# Patient Record
Sex: MALE | Race: BLACK OR AFRICAN AMERICAN | NOT HISPANIC OR LATINO | Employment: FULL TIME | URBAN - METROPOLITAN AREA
[De-identification: names, ages, dates, MRNs, and addresses within clinical notes are randomized per-mention and may not be internally consistent; named-entity substitution may affect disease eponyms.]

---

## 2017-11-20 ENCOUNTER — TELEPHONE (OUTPATIENT)
Dept: FAMILY MEDICINE | Facility: CLINIC | Age: 37
End: 2017-11-20

## 2017-11-20 DIAGNOSIS — Z00.00 ROUTINE GENERAL MEDICAL EXAMINATION AT A HEALTH CARE FACILITY: Primary | ICD-10-CM

## 2017-11-20 NOTE — TELEPHONE ENCOUNTER
----- Message from La Nena Light sent at 11/20/2017  4:04 PM CST -----  Contact: self [phone]  Doctor appointment and lab have been scheduled.  Please link lab orders to the lab appointment.  Date of doctor appointment: 12/12   Physical or EP:  physical  Date of lab appointment:  12/05  Comments:

## 2017-12-05 ENCOUNTER — LAB VISIT (OUTPATIENT)
Dept: LAB | Facility: HOSPITAL | Age: 37
End: 2017-12-05
Attending: FAMILY MEDICINE
Payer: COMMERCIAL

## 2017-12-05 DIAGNOSIS — Z00.00 ROUTINE GENERAL MEDICAL EXAMINATION AT A HEALTH CARE FACILITY: ICD-10-CM

## 2017-12-05 LAB
ALBUMIN SERPL BCP-MCNC: 3.4 G/DL
ALP SERPL-CCNC: 66 U/L
ALT SERPL W/O P-5'-P-CCNC: 36 U/L
ANION GAP SERPL CALC-SCNC: 8 MMOL/L
AST SERPL-CCNC: 19 U/L
BASOPHILS # BLD AUTO: 0.04 K/UL
BASOPHILS NFR BLD: 0.6 %
BILIRUB SERPL-MCNC: 0.4 MG/DL
BUN SERPL-MCNC: 11 MG/DL
CALCIUM SERPL-MCNC: 9.5 MG/DL
CHLORIDE SERPL-SCNC: 108 MMOL/L
CHOLEST SERPL-MCNC: 303 MG/DL
CHOLEST/HDLC SERPL: 7.2 {RATIO}
CO2 SERPL-SCNC: 24 MMOL/L
CREAT SERPL-MCNC: 1.1 MG/DL
DIFFERENTIAL METHOD: ABNORMAL
EOSINOPHIL # BLD AUTO: 0.3 K/UL
EOSINOPHIL NFR BLD: 4.2 %
ERYTHROCYTE [DISTWIDTH] IN BLOOD BY AUTOMATED COUNT: 14 %
EST. GFR  (AFRICAN AMERICAN): >60 ML/MIN/1.73 M^2
EST. GFR  (NON AFRICAN AMERICAN): >60 ML/MIN/1.73 M^2
GLUCOSE SERPL-MCNC: 104 MG/DL
HCT VFR BLD AUTO: 44.5 %
HDLC SERPL-MCNC: 42 MG/DL
HDLC SERPL: 13.9 %
HGB BLD-MCNC: 14.4 G/DL
IMM GRANULOCYTES # BLD AUTO: 0.07 K/UL
IMM GRANULOCYTES NFR BLD AUTO: 1.1 %
LDLC SERPL CALC-MCNC: 208.8 MG/DL
LYMPHOCYTES # BLD AUTO: 2.3 K/UL
LYMPHOCYTES NFR BLD: 34.2 %
MCH RBC QN AUTO: 29.4 PG
MCHC RBC AUTO-ENTMCNC: 32.4 G/DL
MCV RBC AUTO: 91 FL
MONOCYTES # BLD AUTO: 0.7 K/UL
MONOCYTES NFR BLD: 10 %
NEUTROPHILS # BLD AUTO: 3.3 K/UL
NEUTROPHILS NFR BLD: 49.9 %
NONHDLC SERPL-MCNC: 261 MG/DL
NRBC BLD-RTO: 0 /100 WBC
PLATELET # BLD AUTO: 245 K/UL
PMV BLD AUTO: 10.7 FL
POTASSIUM SERPL-SCNC: 4.1 MMOL/L
PROT SERPL-MCNC: 7.5 G/DL
RBC # BLD AUTO: 4.9 M/UL
SODIUM SERPL-SCNC: 140 MMOL/L
TRIGL SERPL-MCNC: 261 MG/DL
WBC # BLD AUTO: 6.63 K/UL

## 2017-12-05 PROCEDURE — 80053 COMPREHEN METABOLIC PANEL: CPT

## 2017-12-05 PROCEDURE — 85025 COMPLETE CBC W/AUTO DIFF WBC: CPT

## 2017-12-05 PROCEDURE — 80061 LIPID PANEL: CPT

## 2017-12-05 PROCEDURE — 36415 COLL VENOUS BLD VENIPUNCTURE: CPT | Mod: PO

## 2018-02-22 ENCOUNTER — OFFICE VISIT (OUTPATIENT)
Dept: FAMILY MEDICINE | Facility: CLINIC | Age: 38
End: 2018-02-22
Payer: COMMERCIAL

## 2018-02-22 VITALS
TEMPERATURE: 98 F | WEIGHT: 295.19 LBS | SYSTOLIC BLOOD PRESSURE: 120 MMHG | HEIGHT: 72 IN | BODY MASS INDEX: 39.98 KG/M2 | HEART RATE: 80 BPM | DIASTOLIC BLOOD PRESSURE: 79 MMHG

## 2018-02-22 DIAGNOSIS — Z23 NEED FOR PROPHYLACTIC VACCINATION AND INOCULATION AGAINST INFLUENZA: ICD-10-CM

## 2018-02-22 DIAGNOSIS — R06.83 SNORING: ICD-10-CM

## 2018-02-22 DIAGNOSIS — Z28.39 IMMUNIZATION DEFICIENCY: ICD-10-CM

## 2018-02-22 DIAGNOSIS — Z00.00 ROUTINE GENERAL MEDICAL EXAMINATION AT A HEALTH CARE FACILITY: Primary | ICD-10-CM

## 2018-02-22 DIAGNOSIS — R40.0 DAYTIME SOMNOLENCE: ICD-10-CM

## 2018-02-22 DIAGNOSIS — H91.93 BILATERAL HEARING LOSS, UNSPECIFIED HEARING LOSS TYPE: ICD-10-CM

## 2018-02-22 DIAGNOSIS — I10 HTN (HYPERTENSION), BENIGN: ICD-10-CM

## 2018-02-22 DIAGNOSIS — Z72.0 TOBACCO USE: ICD-10-CM

## 2018-02-22 DIAGNOSIS — E78.5 HYPERLIPIDEMIA, UNSPECIFIED HYPERLIPIDEMIA TYPE: ICD-10-CM

## 2018-02-22 DIAGNOSIS — B35.3 TINEA PEDIS OF BOTH FEET: ICD-10-CM

## 2018-02-22 DIAGNOSIS — Z80.0 FAMILY HISTORY OF COLON CANCER: ICD-10-CM

## 2018-02-22 PROCEDURE — 90686 IIV4 VACC NO PRSV 0.5 ML IM: CPT | Mod: S$GLB,,, | Performed by: FAMILY MEDICINE

## 2018-02-22 PROCEDURE — 90472 IMMUNIZATION ADMIN EACH ADD: CPT | Mod: S$GLB,,, | Performed by: FAMILY MEDICINE

## 2018-02-22 PROCEDURE — 99999 PR PBB SHADOW E&M-EST. PATIENT-LVL IV: CPT | Mod: PBBFAC,,, | Performed by: FAMILY MEDICINE

## 2018-02-22 PROCEDURE — 99395 PREV VISIT EST AGE 18-39: CPT | Mod: 25,S$GLB,, | Performed by: FAMILY MEDICINE

## 2018-02-22 PROCEDURE — 90471 IMMUNIZATION ADMIN: CPT | Mod: S$GLB,,, | Performed by: FAMILY MEDICINE

## 2018-02-22 PROCEDURE — 90715 TDAP VACCINE 7 YRS/> IM: CPT | Mod: S$GLB,,, | Performed by: FAMILY MEDICINE

## 2018-02-22 RX ORDER — FENOFIBRATE 160 MG/1
160 TABLET ORAL DAILY
Qty: 90 TABLET | Refills: 3 | Status: SHIPPED | OUTPATIENT
Start: 2018-02-22 | End: 2018-02-22

## 2018-02-22 RX ORDER — VARENICLINE TARTRATE 0.5 (11)-1
KIT ORAL
Qty: 1 PACKAGE | Refills: 0 | Status: SHIPPED | OUTPATIENT
Start: 2018-02-22 | End: 2018-07-26 | Stop reason: SDUPTHER

## 2018-02-22 RX ORDER — LOSARTAN POTASSIUM 100 MG/1
100 TABLET ORAL DAILY
Qty: 90 TABLET | Refills: 3 | Status: SHIPPED | OUTPATIENT
Start: 2018-02-22 | End: 2019-08-25 | Stop reason: SDUPTHER

## 2018-02-22 RX ORDER — ATORVASTATIN CALCIUM 40 MG/1
40 TABLET, FILM COATED ORAL DAILY
Qty: 90 TABLET | Refills: 3 | Status: SHIPPED | OUTPATIENT
Start: 2018-02-22 | End: 2019-08-25 | Stop reason: SDUPTHER

## 2018-02-22 NOTE — PROGRESS NOTES
Two patient identifiers used, allergies reviewed, vaccines confirmed.  Tdap vaccine administered IM left deltoid.  Flu consent signed by patient. Flu vaccine administered IM right deltoid.  Pt tolerated both injections well, no redness or bruising at either injection site.  Pt advised to remain in clinic 15 mins following injections for observation.

## 2018-02-22 NOTE — PATIENT INSTRUCTIONS
STAYOFF fenofibrate & rosuvastatin (he has been out for several months due to cost)    Start ATORVASTATIN 40 mg daily    Lipids in 1-2  month    I recommend compression stockings daily    ============================  I counselled patient to stop smoking - high risk of cancer , heart attack and stroke.    Goal LDL cholesterol is < 70    FREE SMOKING CESSATION PROGRAM AT [facility] [address]  Sign up for online ID card [email or web address]  Or  [email or web address]. Then call  [phone].  ===============================      ==============================================  FOR HIGH BLOOD PRESSURE (HYPERTENSION)-------------    Take your Blood pressure medication every day     Try to stop these things that can elevate blood pressuere: salt, caffeine, energy drinks, diet pills, sudafed, alcohol , taking NSAIDS daily (advil, alleve, ibuprofen, naproxen) and birth control    DECREASE ALCOHOL CONSUMPTION    DECREASE SALT (fast foods, frozen, canned, processed foods, ham, turkey, fried foods, chips, crackers, etc) & drink 8 glasses of water a day with minimal caffeine ( 1 cup a day)   ==============================================  Due for  Vaccines  -   Your insurance will pay for certain vaccines only when administered by your pharmacy. We sent a prescription to your pharmacy    ================================  RECOMMENDATIONS FOR MALES   ================================    Prevent the #1 cause of death- cardiovascular disease (HEART ATTACK & STROKE) by checking for normal blood pressure, cholesterol, sugars, & by not smoking.     Yearly FLU shot, ONE pneumonia shot after 65, ONE Shingles vaccine after 60    Screening colonoscopy to check for COLON CANCER,  At 41 yo since your dad was diagnosed at 51 yo    I recommend  high fiber (5 fresh fruits or vegetables daily), low fat diet and aerobic  exercise (huffing/ puffing/ sweating for 20 min straight at least 4 days a week)    Follow up yearly with fasting  lipids, CMP, CBC, TSH prior

## 2018-02-22 NOTE — PROGRESS NOTES
Subjective:      Patient ID: Rigoberto Zurita Jr. is a 37 y.o. male.    Chief Complaint: Annual Exam    Here today for general exam.     Denies any chest pain, shortness of breath, nausea vomiting constipation diarrhea, blood in stool, heartburn    Rash on toes & foot that he would like evaluated    He is out of fenofibrate & rosuvastatin - too costly.     He is smoking & his friend Mirlande is requesting Chantix.     There requesting referral for sleep study with snoring and falling asleep during the day, also he has decreased hearing and they would like referral for hearing evaluation    No results found for: HGBA1C  Lab Results   Component Value Date    MICALBCREAT UNABLE TO CALCULATE 12/07/2011     Lab Results   Component Value Date    LDLCALC 208.8 (H) 12/05/2017    LDLCALC 181.8 (H) 06/22/2016    CHOL 303 (H) 12/05/2017    HDL 42 12/05/2017    TRIG 261 (H) 12/05/2017       Lab Results   Component Value Date     12/05/2017    K 4.1 12/05/2017     12/05/2017    CO2 24 12/05/2017     12/05/2017    BUN 11 12/05/2017    CREATININE 1.1 12/05/2017    CALCIUM 9.5 12/05/2017    PROT 7.5 12/05/2017    ALBUMIN 3.4 (L) 12/05/2017    BILITOT 0.4 12/05/2017    ALKPHOS 66 12/05/2017    AST 19 12/05/2017    ALT 36 12/05/2017    ANIONGAP 8 12/05/2017    ESTGFRAFRICA >60.0 12/05/2017    EGFRNONAA >60.0 12/05/2017    WBC 6.63 12/05/2017    HGB 14.4 12/05/2017    HCT 44.5 12/05/2017    MCV 91 12/05/2017     12/05/2017    TSH 1.833 01/18/2016         Current Outpatient Prescriptions on File Prior to Visit   Medication Sig    ketoconazole (NIZORAL) 2 % cream Apply topically once daily.    [ losartan (COZAAR) 100 MG tablet TAKE 1 TABLET BY MOUTH EVERY DAY     Past Medical History:   Diagnosis Date    HLD (hyperlipidemia) 9/25/2014    goal LDL < 100, fenofibrate    HTN (hypertension), benign 9/25/2014    Tinea pedis of both feet 2/22/2018    Tobacco use 9/25/2014     No past surgical history on  file.  Social History     Social History Narrative    Works at 's office, with Mirlande, smokes 1 pack in 2 weeks, ETOH rarely, 3 yo daughter & 10 yo     Family History   Problem Relation Age of Onset    Thyroid disease Mother     Hypertension Mother     Hyperlipidemia Mother     Heart disease Mother      stent    Colon cancer Father      about age 50    Hypertension Maternal Grandfather     Hyperlipidemia Maternal Grandfather     Heart disease Maternal Grandfather     No Known Problems Daughter     No Known Problems Daughter      Vitals:    02/22/18 0926   BP: 120/79   Pulse: 80   Temp: 97.8 °F (36.6 °C)   Weight: 133.9 kg (295 lb 3.1 oz)   Height: 6' (1.829 m)     Objective:   Physical Exam   Constitutional: He is oriented to person, place, and time. He appears well-developed and well-nourished.   HENT:   Head: Normocephalic and atraumatic.   Right Ear: External ear normal.   Left Ear: External ear normal.   Nose: Nose normal.   Mouth/Throat: Oropharynx is clear and moist.   Eyes: EOM are normal. Pupils are equal, round, and reactive to light.   Neck: Neck supple. No thyromegaly present.   Cardiovascular: Normal rate, regular rhythm, normal heart sounds and intact distal pulses.    No murmur heard.  Pulmonary/Chest: Effort normal and breath sounds normal. He has no wheezes.   Abdominal: Soft. Bowel sounds are normal. He exhibits no distension and no mass. There is no tenderness. There is no rebound and no guarding.   Musculoskeletal: He exhibits no edema.   Lymphadenopathy:     He has no cervical adenopathy.   Neurological: He is alert and oriented to person, place, and time.   Skin: Skin is warm and dry.   Thickened toenails ,  bilateral soles of feet feet with flaking   Psychiatric: He has a normal mood and affect. His behavior is normal.     Assessment:     1. Routine general medical examination at a health care facility    2. Immunization deficiency    3. Need for prophylactic vaccination and  inoculation against influenza    4. HTN (hypertension), benign    5. Hyperlipidemia, unspecified hyperlipidemia type    6. Tobacco use    7. Family history of colon cancer    8. Tinea pedis of both feet    9. Daytime somnolence    10. Snoring    11. Bilateral hearing loss, unspecified hearing loss type      Plan:     Orders Placed This Encounter    Polysomnogram (CPAP will be added if patient meets diagnostic criteria.)    (In Office Administered) Tdap Vaccine    Flu Vaccine - Quadrivalent (PF) (3 years & older)    Lipid panel    Ambulatory referral to Podiatry    Ambulatory referral to ENT    Ambulatory consult to Audiology    losartan (COZAAR) 100 MG tablet    varenicline (CHANTIX STARTING MONTH BOX) 0.5 mg (11)- 1 mg (42) tablet    atorvastatin (LIPITOR) 40 MG tablet     His friend Mirlande & he are joining a gym [employer]    Patient Instructions   STAYOFF fenofibrate & rosuvastatin (he has been out for several months due to cost)    Start ATORVASTATIN 40 mg daily    Lipids in 1-2  month    I recommend compression stockings daily    ============================  I counselled patient to stop smoking - high risk of cancer , heart attack and stroke.    Goal LDL cholesterol is < 70    FREE SMOKING CESSATION PROGRAM AT [facility]  Sign up for online ID card [email or web address]  Or  [email or web address]. Then call  [phone].  ===============================      ==============================================  FOR HIGH BLOOD PRESSURE (HYPERTENSION)-------------    Take your Blood pressure medication every day     Try to stop these things that can elevate blood pressuere: salt, caffeine, energy drinks, diet pills, sudafed, alcohol , taking NSAIDS daily (advil, alleve, ibuprofen, naproxen) and birth control    DECREASE ALCOHOL CONSUMPTION    DECREASE SALT (fast foods, frozen, canned, processed foods, ham, turkey, fried foods, chips, crackers, etc) & drink 8 glasses of water a day with minimal  caffeine ( 1 cup a day)   ==============================================  Due for  Vaccines  -   Your insurance will pay for certain vaccines only when administered by your pharmacy. We sent a prescription to your pharmacy    ================================  RECOMMENDATIONS FOR MALES   ================================    Prevent the #1 cause of death- cardiovascular disease (HEART ATTACK & STROKE) by checking for normal blood pressure, cholesterol, sugars, & by not smoking.     Yearly FLU shot, ONE pneumonia shot after 65, ONE Shingles vaccine after 60    Screening colonoscopy to check for COLON CANCER,  At 43 yo since your dad was diagnosed at 51 yo    I recommend  high fiber (5 fresh fruits or vegetables daily), low fat diet and aerobic  exercise (huffing/ puffing/ sweating for 20 min straight at least 4 days a week)    Follow up yearly with fasting lipids, CMP, CBC, TSH prior

## 2018-02-28 ENCOUNTER — TELEPHONE (OUTPATIENT)
Dept: FAMILY MEDICINE | Facility: CLINIC | Age: 38
End: 2018-02-28

## 2018-02-28 NOTE — TELEPHONE ENCOUNTER
Pt advised Prior Auth for Chantix requires documentation that pt has had a history of failure, contradiction, or intolerance to:    Buproion  Habitrol OTC  Nicoderm CQ OTC  Nicorette gum OTC  Nicorette lozenge OTC  Nicorette mini-lozenge OTC  Thrive gum OTC  Thrive lozenge OTC    Pt has not tried any of the above and verbalizes understanding that he can call our office back in 6-8 weeks if any of the above medications prove ineffective.

## 2018-03-05 ENCOUNTER — OFFICE VISIT (OUTPATIENT)
Dept: PODIATRY | Facility: CLINIC | Age: 38
End: 2018-03-05
Payer: COMMERCIAL

## 2018-03-05 VITALS — BODY MASS INDEX: 39.96 KG/M2 | HEIGHT: 72 IN | WEIGHT: 295 LBS

## 2018-03-05 DIAGNOSIS — B35.1 ONYCHOMYCOSIS DUE TO DERMATOPHYTE: ICD-10-CM

## 2018-03-05 DIAGNOSIS — B35.3 TINEA PEDIS OF BOTH FEET: Primary | ICD-10-CM

## 2018-03-05 PROCEDURE — 99999 PR PBB SHADOW E&M-EST. PATIENT-LVL III: CPT | Mod: PBBFAC,,,

## 2018-03-05 PROCEDURE — 99203 OFFICE O/P NEW LOW 30 MIN: CPT | Mod: S$GLB,,,

## 2018-03-05 RX ORDER — TERBINAFINE HYDROCHLORIDE 250 MG/1
250 TABLET ORAL DAILY
Qty: 90 TABLET | Refills: 0 | Status: SHIPPED | OUTPATIENT
Start: 2018-03-05 | End: 2018-06-04

## 2018-03-05 NOTE — PROGRESS NOTES
Chief Complaint   Patient presents with    Tinea Pedis      Bilateral       History of present illness:Patient presents to clinic complaining of painful thickened and discolored  Toenails as well as itchy scaly rash to feet, tried creams which did not help. Patint is having tenderness with palpation and with shoe gear. Patient has tried some topical antifungals which did not help. No history of liver disease.    Review of Systems:  Vascular : negative for rest pain, claudication or bruising  Respiratory: negative for cough or shortness of breath  Musculoskeletal: negative for joint pain   Skin: Positive for toenail infection   Neurological: negative for burning, tingling and numbness  All other negative.    Constitutional:  Patient is oriented to person, place, and time. Vital signs are normal.  Appears well-developed and well-nourished.     Vascular:  Dorsalis pedis pulses are 2+ on the right side, and 2+ on the left side.   Posterior tibial pulses are 2+ on the right side, and 2+ on the left side.    + digital hair growth, capillary fill time to all toes <3 seconds, no swelling    Skin/Dermatological:  Skin is warm and intact.  No cyanosis or clubbing. No rashes noted.  No open wounds.  All toenails discolored, thickened with subungual debris, tender to palpation  Ada distribution of erythema and scale bilateral feet    Musculoskeletal:      Normal range of motion of bilateral pedal joints and bilateral ankle with knee flexed and extended, no deformity, tenderness or crepitus. No assymmetries noted.        Neurological:  No deficits to sharp/dull, light touch or vibratory sensation.   Muscle strength to tibialis anterior, extensor hallucis longus, extensor digitorum longus, peroneal muscles, flexor hallucis/digotorum longus, posterior tibial and gastrosoleal complex is 5/5, normal tone without assymmetry             Assessment/Plan:    Onychomycosis, tinea pedis: Discussed etiology of the problem and  treatment options, including topical, oral and laser therapy. LFT recent ok.  Rx Lamisil 250 mg orally x 12 weeks. Repeat liver function tests in 6 weeks.  Lysol to this shoes.  Avoid cotton socks.  Antifungal sprays to the feet.  Return to clinic 3 months.

## 2018-03-05 NOTE — PATIENT INSTRUCTIONS
Keep feet clean and dry.  Vinegar soaks weekly (1 parts vinegar/2 part warm water).   Avoid cotton socks.  Powders to shoes, antiperspirants to feet daily.  Lysol spray to shoes twice weekly.    Liver blood test in 6 weeks.    F/U with me in 3 months.    Use topical antifungal for toenails daily over the counter.

## 2018-03-05 NOTE — LETTER
March 5, 2018      Karma Bryant MD  [address]  [address]  [facility] [place] [facility] [address]           [place] - Podiatry  [address] [address]  Phone: [phone]          Patient: Rigoberto Zurita Jr.   MR Number: [id number]   YOB: 1980   Date of Visit: 3/5/2018       Dear Dr. Karma Bryant:    Thank you for referring Rigoberto Zurita to me for evaluation. Attached you will find relevant portions of my assessment and plan of care.    If you have questions, please do not hesitate to call me. I look forward to following Rigoberto Zurita along with you.    Sincerely,    Jeff Geiger, LEXIE    Enclosure  CC:  No Recipients    If you would like to receive this communication electronically, please contact [email or web address] or [phone] to request more information on "[employer] Link access.    For providers and/or their staff who would like to refer a patient to [facility], please contact us through our one-stop-shop provider referral line, [facility], at [phone].    If you feel you have received this communication in error or would no longer like to receive these types of communications, please e-mail [email or web address]

## 2018-03-19 ENCOUNTER — TELEPHONE (OUTPATIENT)
Dept: SLEEP MEDICINE | Facility: CLINIC | Age: 38
End: 2018-03-19

## 2018-03-26 ENCOUNTER — TELEPHONE (OUTPATIENT)
Dept: OTOLARYNGOLOGY | Facility: CLINIC | Age: 38
End: 2018-03-26

## 2018-03-26 ENCOUNTER — OFFICE VISIT (OUTPATIENT)
Dept: OTOLARYNGOLOGY | Facility: CLINIC | Age: 38
End: 2018-03-26
Payer: COMMERCIAL

## 2018-03-26 ENCOUNTER — CLINICAL SUPPORT (OUTPATIENT)
Dept: OTOLARYNGOLOGY | Facility: CLINIC | Age: 38
End: 2018-03-26
Payer: COMMERCIAL

## 2018-03-26 VITALS
DIASTOLIC BLOOD PRESSURE: 82 MMHG | SYSTOLIC BLOOD PRESSURE: 132 MMHG | HEIGHT: 72 IN | TEMPERATURE: 97 F | HEART RATE: 77 BPM

## 2018-03-26 DIAGNOSIS — H69.93 DYSFUNCTION OF BOTH EUSTACHIAN TUBES: ICD-10-CM

## 2018-03-26 DIAGNOSIS — H90.6 MIXED CONDUCTIVE AND SENSORINEURAL HEARING LOSS OF BOTH EARS: ICD-10-CM

## 2018-03-26 DIAGNOSIS — J34.2 NASAL SEPTAL DEVIATION: ICD-10-CM

## 2018-03-26 DIAGNOSIS — R06.83 SNORING: ICD-10-CM

## 2018-03-26 DIAGNOSIS — R09.81 NASAL CONGESTION: ICD-10-CM

## 2018-03-26 DIAGNOSIS — H90.6 MIXED HEARING LOSS, BILATERAL: Primary | ICD-10-CM

## 2018-03-26 PROCEDURE — 3075F SYST BP GE 130 - 139MM HG: CPT | Mod: CPTII,S$GLB,, | Performed by: OTOLARYNGOLOGY

## 2018-03-26 PROCEDURE — 31231 NASAL ENDOSCOPY DX: CPT | Mod: S$GLB,,, | Performed by: OTOLARYNGOLOGY

## 2018-03-26 PROCEDURE — 92550 TYMPANOMETRY & REFLEX THRESH: CPT | Mod: 52,S$GLB,, | Performed by: AUDIOLOGIST

## 2018-03-26 PROCEDURE — 92557 COMPREHENSIVE HEARING TEST: CPT | Mod: S$GLB,,, | Performed by: AUDIOLOGIST

## 2018-03-26 PROCEDURE — 3079F DIAST BP 80-89 MM HG: CPT | Mod: CPTII,S$GLB,, | Performed by: OTOLARYNGOLOGY

## 2018-03-26 PROCEDURE — 99204 OFFICE O/P NEW MOD 45 MIN: CPT | Mod: 25,S$GLB,, | Performed by: OTOLARYNGOLOGY

## 2018-03-26 RX ORDER — AZELASTINE 1 MG/ML
2 SPRAY, METERED NASAL 2 TIMES DAILY
Qty: 30 ML | Refills: 2 | Status: SHIPPED | OUTPATIENT
Start: 2018-03-26 | End: 2018-03-26

## 2018-03-26 RX ORDER — OLOPATADINE HYDROCHLORIDE 665 UG/1
1 SPRAY NASAL 2 TIMES DAILY
Qty: 1 BOTTLE | Refills: 0 | Status: CANCELLED | OUTPATIENT
Start: 2018-03-26

## 2018-03-26 RX ORDER — FLUTICASONE PROPIONATE 50 MCG
2 SPRAY, SUSPENSION (ML) NASAL NIGHTLY
Qty: 16 G | Refills: 2 | Status: SHIPPED | OUTPATIENT
Start: 2018-03-26 | End: 2020-01-27 | Stop reason: CLARIF

## 2018-03-26 RX ORDER — OLOPATADINE HYDROCHLORIDE 665 UG/1
1 SPRAY NASAL 2 TIMES DAILY
Qty: 1 BOTTLE | Refills: 0 | Status: SHIPPED | OUTPATIENT
Start: 2018-03-26 | End: 2018-03-26

## 2018-03-26 RX ORDER — METHYLPREDNISOLONE 4 MG/1
TABLET ORAL
Qty: 1 PACKAGE | Refills: 0 | Status: SHIPPED | OUTPATIENT
Start: 2018-03-26 | End: 2018-07-26 | Stop reason: ALTCHOICE

## 2018-03-26 NOTE — LETTER
April 11, 2018      Karma Bryant MD  [address] [facility]  [address]  [place] [facility] [place] [facility] [address]           [place] [facility] O[facility]hinol[facility]ology  [address]  [facility] [address]  Phone: [phone]  Fax: [phone]          Patient: Rigoberto Zurita Jr.   MR Number: [id number]   YOB: 1980   Date of Visit: 3/26/2018       Dear Dr. Karma Bryant:    Thank you for referring Rigoberto Zurita to me for evaluation. Attached you will find relevant portions of my assessment and plan of care.    If you have questions, please do not hesitate to call me. I look forward to following Rigoberto Zurita along with you.    Sincerely,    Manish Lorenz  CC:  No Recipients    If you would like to receive this communication electronically, please contact [email or web address] or [phone] to request more information on [employer] Link access.    For providers and/or their staff who would like to refer a patient to [facility], please contact us through our one-stop-shop provider referral line, [facility], at [phone].    If you feel you have received this communication in error or would no longer like to receive these types of communications, please e-mail [email or web address]

## 2018-03-26 NOTE — PATIENT INSTRUCTIONS
Elevate head when sleeping as able.  Azelastine nasal spray 2 sprays in each nostril twice a day.  Steroid dose pack to start in am as per instructions.  Weight loss.  Insufflation exercises.  Follow in 2 - 3 weeks.     Addendum:  Pt called re cost of azelastine and replaced with generic Patanase spray.     Addendum:  Pt called again re cost of generic Patanase and changed to fluticasone nasal spray.

## 2018-03-26 NOTE — PROCEDURES
Nasal/sinus endoscopy  Date/Time: 3/26/2018 1:54 PM  Performed by: CINDY PATTON  Authorized by: CINDY PATTON     Consent Done?:  Yes (Verbal)  Anesthesia:     Local anesthetic:  Lidocaine 2% and Tutu-Synephrine 1/2%    Patient tolerance:  Patient tolerated the procedure well with no immediate complications  Nose:     Procedure Performed:  Nasal Endoscopy  External:      No external nasal deformity  Intranasal:      Mucosa no polyps     Mucosa ulcers not present     No mucosa lesions present     Septum gross deformity  Nasopharynx:      No mucosa lesions     Posterior choanae patent     There is some diffuse nasal mucosal congestion bilaterally with nasal septal deviation and occasional sneezing which limits evaluation to some degree.  However there is no pus or polyps or lesions seen.

## 2018-03-26 NOTE — TELEPHONE ENCOUNTER
----- Message from Herberth Landeros sent at 3/26/2018  2:26 PM CDT -----  Contact: [facility]  x_ 1st Request   _ 2nd Request   _ 3rd Request     Who: ZOIE HUNT JR. [[id number]]    Why: Pharmacy is requesting a call back in reference to Rx azelastine (ASTELIN) 137 mcg (0.1 %) nasal spray being too expensive and would like to discuss alternatives.      What Number to Call Back: [phone]    When to Expect a call back: (Before the end of the day)   -- if call after 3:00 call back will be tomorrow.

## 2018-03-26 NOTE — PROGRESS NOTES
Moderate mixed hearing loss with rounded, large tympanic width tympanograms and negative pressure bilaterally.  Absent ipsi reflexes.

## 2018-03-27 NOTE — PROGRESS NOTES
Subjective:       Patient ID: Rigoberto Zurita Jr. is a 37 y.o. male.    Chief Complaint: Hearing Loss (Audiogram done) and Nasal Congestion    He is a new patient for me here today complaining of difficulty hearing for many years.  His wife has accompanied him and states she believes he has had hearing loss since she has known him which is since age 15.  He reports having PE tubes twice as a child and denies subsequent problems with ear infections.  There has been no tinnitus or otorrhea or otalgia.  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  He denies other acoustical trauma.  He denies any nasal symptoms, however his wife reports he has had frequent nasal congestion with snoring and he is being evaluated for sleep apnea.  He then admits a recent flare of some nasal congestion which he associates with the springlike weather.  He denies prior treatment of this including no over-the-counter products.          Review of Systems   Ears: Positive for hearing loss.  Negative for ear pain, ear pressure, ringing in ear, ear discharge, ear infections, dizziness, head trauma, taken gentramycin/streptomycin and family history of hearing loss.    Nose:  Positive for postnasal drip and snoring. Negative for nosebleeds, nasal obstruction, nasal or sinus surgery and loss of smell.    Mouth/Throat: Negative for pain swallowing, impaired swallowing, hoarse voice, throat mass, neck mass, oral ulcers and neck lumps.   Constitutional: Negative for recent unexplained weight loss, fever, chills and night sweats.    Eyes:  Negative for history of glaucoma and visual change.   Cardiovascular:  Positive for history of high blood pressure. Negative for chest pain and palpitations.   Respiratory:  Positive for recent cough. Negative for asthma, emphysema, history of tuberculosis and shortness of breath.    Gastrointestinal:  Negative for history of stomach ulcers or pain, acid reflux, indigestion, blood in  stool and change in stool.   Other:  Positive for kidney problem. Negative for bladder problem, prostate disease, arthritis, new or changing moles, weakness, disturbances in coordination, slurred, confusion, swollen glands, anemia and persistent infections.           Objective:        Vitals:    03/26/18 1211   BP: 132/82   Pulse: 77   Temp: 97.4 °F (36.3 °C)       Physical Exam   Constitutional: He is oriented to person, place, and time. He appears well-developed and well-nourished. No distress.   HENT:   Head: Normocephalic and atraumatic.   Right Ear: External ear normal.   Left Ear: External ear and ear canal normal.   Nose: No rhinorrhea or nasal deformity. No epistaxis.   Mouth/Throat: Uvula is midline, oropharynx is clear and moist and mucous membranes are normal. No oral lesions. No trismus in the jaw. No uvula swelling. No oropharyngeal exudate, posterior oropharyngeal edema or posterior oropharyngeal erythema.   The tympanic membranes are very retracted bilaterally, right greater than left.  There is some nasal mucosal congestion bilaterally on anterior exam with mucoid secretions.  The soft palate is elongated and the posterior tonsillar pillars are generous.   Eyes: Conjunctivae are normal. Right eye exhibits no discharge. Left eye exhibits no discharge. No scleral icterus.   Neck: Normal range of motion and phonation normal. Neck supple. No tracheal deviation present. No thyromegaly present.   Pulmonary/Chest: Effort normal. No stridor. No respiratory distress.   Musculoskeletal: Normal range of motion. He exhibits no tenderness.   Lymphadenopathy:     He has no cervical adenopathy.   Neurological: He is alert and oriented to person, place, and time. He displays no weakness. Coordination normal.   Skin: Skin is warm and dry. He is not diaphoretic.   Psychiatric: He has a normal mood and affect. His behavior is normal. His speech is not slurred.       Tests / Results:        Assessment:       1. Mixed  conductive and sensorineural hearing loss of both ears    2. Dysfunction of both eustachian tubes    3. Nasal congestion    4. Nasal septal deviation    5. Snoring        Plan:        Audiogram performed and reviewed with patient.    Reviewed above and nasal endoscopy and he would like to proceed.  See procedure note.      Reviewed results of above audiogram which reveals moderate mixed loss bilaterally.  Discussed above history and findings and considerations and recommendations.  He indicates he is adverse to additional PE tubes and would like medical therapy at this time with follow-up.  Discussed over-the-counter fluticasone nasal spray versus prescription azelastine nasal spray and he would like the azelastine.  Will also give short course of Medrol Dosepak and reviewed pros cons and precautions.  Insufflation exercises.  Elevate head when sleeping and work on weight loss.  Follow-up in 3 weeks.

## 2018-04-11 ENCOUNTER — TELEPHONE (OUTPATIENT)
Dept: SLEEP MEDICINE | Facility: CLINIC | Age: 38
End: 2018-04-11

## 2018-04-11 ENCOUNTER — TELEPHONE (OUTPATIENT)
Dept: PODIATRY | Facility: CLINIC | Age: 38
End: 2018-04-11

## 2018-04-11 NOTE — TELEPHONE ENCOUNTER
Called patient and reminded him to complete his 6 week LFT lab work. Patient communicated understanding.

## 2018-04-16 ENCOUNTER — LAB VISIT (OUTPATIENT)
Dept: LAB | Facility: HOSPITAL | Age: 38
End: 2018-04-16
Payer: COMMERCIAL

## 2018-04-16 DIAGNOSIS — B35.1 ONYCHOMYCOSIS DUE TO DERMATOPHYTE: ICD-10-CM

## 2018-04-16 DIAGNOSIS — B35.3 TINEA PEDIS OF BOTH FEET: ICD-10-CM

## 2018-04-16 LAB
ALBUMIN SERPL BCP-MCNC: 3.3 G/DL
ALP SERPL-CCNC: 56 U/L
ALT SERPL W/O P-5'-P-CCNC: 32 U/L
AST SERPL-CCNC: 22 U/L
BILIRUB DIRECT SERPL-MCNC: 0.2 MG/DL
BILIRUB SERPL-MCNC: 0.3 MG/DL
PROT SERPL-MCNC: 6.5 G/DL

## 2018-04-16 PROCEDURE — 36415 COLL VENOUS BLD VENIPUNCTURE: CPT | Mod: PO

## 2018-04-16 PROCEDURE — 80076 HEPATIC FUNCTION PANEL: CPT

## 2018-04-17 ENCOUNTER — TELEPHONE (OUTPATIENT)
Dept: PODIATRY | Facility: CLINIC | Age: 38
End: 2018-04-17

## 2018-04-17 NOTE — TELEPHONE ENCOUNTER
----- Message from Jeff Geiger DPM sent at 4/17/2018  8:16 AM CDT -----  Second lft ok continue oral lamisil.      Called patient and left message ok to continue lamisil.

## 2018-04-30 ENCOUNTER — HOSPITAL ENCOUNTER (OUTPATIENT)
Dept: SLEEP MEDICINE | Facility: OTHER | Age: 38
Discharge: HOME | End: 2018-04-30
Payer: COMMERCIAL

## 2018-04-30 DIAGNOSIS — R06.83 SNORING: ICD-10-CM

## 2018-04-30 DIAGNOSIS — R40.0 DAYTIME SOMNOLENCE: ICD-10-CM

## 2018-04-30 DIAGNOSIS — I10 HTN (HYPERTENSION), BENIGN: ICD-10-CM

## 2018-04-30 PROCEDURE — 95811 POLYSOM 6/>YRS CPAP 4/> PARM: CPT

## 2018-04-30 PROCEDURE — 95811 POLYSOM 6/>YRS CPAP 4/> PARM: CPT | Mod: 26,,, | Performed by: INTERNAL MEDICINE

## 2018-05-01 NOTE — PROGRESS NOTES
A Split night study was preformed on Rigoberto Zurita [facility] night of 4/30/18. The procedure was explained to the patient in great detail, which included the function of all the wires, when and why the tech would need to enter the room and the possibility of being placed on cpap during the night if criteria were meet, which was all discussed prior to the start of the study. All questions were asked and answered prior to the setup. The patient did meet split night criteria.     EKG: Appeared to be NSR with PVC's    Cpap mask: F&P Eson nasal mask medium w/ a chin strap    Cpap range: [id number], cflex 3, humidity used    Optimal pressure: Best contoll of sleep disordered breathing events appeared to be at 56els1M, as to which supine REM was noted.    A number of sleep disordered breathing events were noted prior to the start of cpap. Snoring was noted to be mild to moderate causing a few arousals. PLM's appeared with arousals and position changes. All sleep stages appeared to be noted during the night. The patient appeareed to have tolerated cpap and the mask fairly well. An end of the night instruction sheet was given to the patient prior to leaving the lab. The patient's response to Cpap at the susan of the night was that it wasn't bad, it will take some getting use too.

## 2018-07-26 ENCOUNTER — LAB VISIT (OUTPATIENT)
Dept: LAB | Facility: HOSPITAL | Age: 38
End: 2018-07-26
Attending: FAMILY MEDICINE
Payer: COMMERCIAL

## 2018-07-26 ENCOUNTER — OFFICE VISIT (OUTPATIENT)
Dept: FAMILY MEDICINE | Facility: CLINIC | Age: 38
End: 2018-07-26
Payer: COMMERCIAL

## 2018-07-26 ENCOUNTER — TELEPHONE (OUTPATIENT)
Dept: FAMILY MEDICINE | Facility: CLINIC | Age: 38
End: 2018-07-26

## 2018-07-26 VITALS
WEIGHT: 289.69 LBS | SYSTOLIC BLOOD PRESSURE: 136 MMHG | RESPIRATION RATE: 20 BRPM | DIASTOLIC BLOOD PRESSURE: 74 MMHG | TEMPERATURE: 98 F | HEIGHT: 72 IN | BODY MASS INDEX: 39.24 KG/M2

## 2018-07-26 DIAGNOSIS — Z72.0 TOBACCO USE: ICD-10-CM

## 2018-07-26 DIAGNOSIS — I10 HTN (HYPERTENSION), BENIGN: ICD-10-CM

## 2018-07-26 DIAGNOSIS — G47.33 OSA (OBSTRUCTIVE SLEEP APNEA): Primary | ICD-10-CM

## 2018-07-26 DIAGNOSIS — E78.00 PURE HYPERCHOLESTEROLEMIA: ICD-10-CM

## 2018-07-26 DIAGNOSIS — S66.912A HAND STRAIN, LEFT, INITIAL ENCOUNTER: ICD-10-CM

## 2018-07-26 LAB
CHOLEST SERPL-MCNC: 290 MG/DL
CHOLEST/HDLC SERPL: 7.6 {RATIO}
HDLC SERPL-MCNC: 38 MG/DL
HDLC SERPL: 13.1 %
LDLC SERPL CALC-MCNC: ABNORMAL MG/DL
NONHDLC SERPL-MCNC: 252 MG/DL
TRIGL SERPL-MCNC: 412 MG/DL

## 2018-07-26 PROCEDURE — 3008F BODY MASS INDEX DOCD: CPT | Mod: CPTII,S$GLB,, | Performed by: FAMILY MEDICINE

## 2018-07-26 PROCEDURE — 36415 COLL VENOUS BLD VENIPUNCTURE: CPT | Mod: PO

## 2018-07-26 PROCEDURE — 3078F DIAST BP <80 MM HG: CPT | Mod: CPTII,S$GLB,, | Performed by: FAMILY MEDICINE

## 2018-07-26 PROCEDURE — 99999 PR PBB SHADOW E&M-EST. PATIENT-LVL III: CPT | Mod: PBBFAC,,, | Performed by: FAMILY MEDICINE

## 2018-07-26 PROCEDURE — 3075F SYST BP GE 130 - 139MM HG: CPT | Mod: CPTII,S$GLB,, | Performed by: FAMILY MEDICINE

## 2018-07-26 PROCEDURE — 80061 LIPID PANEL: CPT

## 2018-07-26 PROCEDURE — 99214 OFFICE O/P EST MOD 30 MIN: CPT | Mod: S$GLB,,, | Performed by: FAMILY MEDICINE

## 2018-07-26 RX ORDER — VARENICLINE TARTRATE 0.5 (11)-1
KIT ORAL
Qty: 1 PACKAGE | Refills: 0 | Status: SHIPPED | OUTPATIENT
Start: 2018-07-26 | End: 2018-08-21

## 2018-07-26 NOTE — PATIENT INSTRUCTIONS
==============================================  FOR HIGH BLOOD PRESSURE (HYPERTENSION)-------------    Take your medication every day - BP normal    Try to stop these things that can elevate blood pressure: salt, caffeine, energy drinks, diet pills, sudafed, alcohol , taking NSAIDS daily (advil, alleve, ibuprofen, naproxen) and birth control    DECREASE ALCOHOL CONSUMPTION    DECREASE SALT (fast foods, frozen, canned, processed foods, ham, turkey, fried foods, chips, crackers, etc) & drink 8 glasses of water a day with minimal caffeine ( 1 cup a day)   ==============================================    We discussed the increased risk of heart attack, stroke  Focus on high fiber, low salt, low fat diet, exercise   Follow up yearly with fasting lipids, CMP, CBC, TSH prior        
76
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
## 2018-07-30 ENCOUNTER — TELEPHONE (OUTPATIENT)
Dept: FAMILY MEDICINE | Facility: CLINIC | Age: 38
End: 2018-07-30

## 2018-07-30 NOTE — TELEPHONE ENCOUNTER
Please ask pt if he was taking Lipitor 40 mg daily for 2 months prior to this cholesterol test.     It's very elevated, TG > 400s

## 2018-07-30 NOTE — TELEPHONE ENCOUNTER
Pt reports that he has been taking Atorvastatin 40mg for the last two months and may have only missed taking it  one or two days.

## 2018-07-31 DIAGNOSIS — E78.00 PURE HYPERCHOLESTEROLEMIA: Primary | ICD-10-CM

## 2018-07-31 NOTE — TELEPHONE ENCOUNTER
Focus on Lo fat , high fiber foods and repeat fasting lipid profile in 2 months    No fried foods, try to walk several days a  Week for 15 minutes

## 2018-08-14 ENCOUNTER — TELEPHONE (OUTPATIENT)
Dept: FAMILY MEDICINE | Facility: CLINIC | Age: 38
End: 2018-08-14

## 2018-08-14 NOTE — TELEPHONE ENCOUNTER
PA denied.  Pt must first try and fail Bupropion.  Voice mail msg left for pt to call nurse to discuss.

## 2018-08-17 NOTE — TELEPHONE ENCOUNTER
2nd voice mail msg left for pt.  Chantix not covered until pt has tried and failed Bupropion.  Call nurse to advise if Rx should be sent to his pharm.

## 2018-08-21 RX ORDER — BUPROPION HYDROCHLORIDE 150 MG/1
150 TABLET ORAL DAILY
Qty: 60 TABLET | Refills: 3 | Status: SHIPPED | OUTPATIENT
Start: 2018-08-21 | End: 2020-02-18

## 2019-08-26 RX ORDER — LOSARTAN POTASSIUM 100 MG/1
TABLET ORAL
Qty: 90 TABLET | Refills: 0 | Status: SHIPPED | OUTPATIENT
Start: 2019-08-26 | End: 2020-01-27

## 2019-08-26 RX ORDER — ATORVASTATIN CALCIUM 40 MG/1
TABLET, FILM COATED ORAL
Qty: 90 TABLET | Refills: 0 | Status: SHIPPED | OUTPATIENT
Start: 2019-08-26 | End: 2020-02-18 | Stop reason: SDUPTHER

## 2019-08-26 NOTE — TELEPHONE ENCOUNTER
Phone msg left for pt refills authorized.  Over one year since last annual exam and fasting labs.  Call Paola at Dr. Bryant's office to schedule both appts.

## 2020-01-27 ENCOUNTER — HOSPITAL ENCOUNTER (EMERGENCY)
Facility: HOSPITAL | Age: 40
Discharge: HOME | End: 2020-01-27
Attending: EMERGENCY MEDICINE
Payer: COMMERCIAL

## 2020-01-27 VITALS
SYSTOLIC BLOOD PRESSURE: 153 MMHG | WEIGHT: 314.81 LBS | TEMPERATURE: 98 F | DIASTOLIC BLOOD PRESSURE: 76 MMHG | RESPIRATION RATE: 16 BRPM | HEIGHT: 71 IN | OXYGEN SATURATION: 99 % | BODY MASS INDEX: 44.07 KG/M2 | HEART RATE: 80 BPM

## 2020-01-27 DIAGNOSIS — M25.559 HIP PAIN: ICD-10-CM

## 2020-01-27 DIAGNOSIS — M54.32 SCIATICA OF LEFT SIDE: Primary | ICD-10-CM

## 2020-01-27 PROCEDURE — 25000003 PHARM REV CODE 250: Mod: ER | Performed by: PHYSICIAN ASSISTANT

## 2020-01-27 PROCEDURE — 99284 EMERGENCY DEPT VISIT MOD MDM: CPT | Mod: 25,ER

## 2020-01-27 RX ORDER — KETOROLAC TROMETHAMINE 10 MG/1
10 TABLET, FILM COATED ORAL EVERY 6 HOURS
Qty: 10 TABLET | Refills: 0 | Status: SHIPPED | OUTPATIENT
Start: 2020-01-27 | End: 2020-02-18

## 2020-01-27 RX ORDER — LOSARTAN POTASSIUM 100 MG/1
100 TABLET ORAL DAILY
Qty: 30 TABLET | Refills: 0 | Status: SHIPPED | OUTPATIENT
Start: 2020-01-27 | End: 2020-02-18

## 2020-01-27 RX ORDER — KETOROLAC TROMETHAMINE 10 MG/1
10 TABLET, FILM COATED ORAL
Status: COMPLETED | OUTPATIENT
Start: 2020-01-27 | End: 2020-01-27

## 2020-01-27 RX ADMIN — KETOROLAC TROMETHAMINE 10 MG: 10 TABLET, FILM COATED ORAL at 07:01

## 2020-01-28 NOTE — DISCHARGE INSTRUCTIONS
Your x-ray did not reveal any evidence of fracture or dislocation.  You are instructed to follow up with your primary care provider for re-evaluation within 3 days.  You are instructed to return to the emergency department immediately for any new or worsening symptoms.

## 2020-01-28 NOTE — ED PROVIDER NOTES
"Encounter Date: 1/27/2020       History     Chief Complaint   Patient presents with    Leg Pain     Pt with c/o L upper leg pain x "a couple weeks." Pt denies injury, numbness or tingling     39-year-old male presents for evaluation of 2 week history of left hip and upper thigh pain.  He reports that the pain began gradually approximately 2 weeks ago and have been intermittent since onset.  He reports that the pain is worse with movement and walking.  He has attempted treatment at home with Tylenol and topical muscle cream  with no relief of symptoms. He denies any numbness, tingling, weakness or swelling to the lower extremities. He denies any direct trauma to his hip or leg.  He denies any neck or back pain.  No treatment was attempted prior to arrival.  He denies any other associated symptoms including headache, dizziness, vision changes, neck pain, or chest pain.        Review of patient's allergies indicates:  No Known Allergies  Past Medical History:   Diagnosis Date    HLD (hyperlipidemia) 9/25/2014    goal LDL < 100, fenofibrate    HTN (hypertension), benign 9/25/2014    ANN MARIE (obstructive sleep apnea) 7/26/2018    Cpap range: [id number], cflex 3, humidity used    Tinea pedis of both feet 2/22/2018    Tobacco use 9/25/2014     History reviewed. No pertinent surgical history.  Family History   Problem Relation Age of Onset    Thyroid disease Mother     Hypertension Mother     Hyperlipidemia Mother     Heart disease Mother         stent    Colon cancer Father         about age 50    Hypertension Maternal Grandfather     Hyperlipidemia Maternal Grandfather     Heart disease Maternal Grandfather     No Known Problems Daughter     No Known Problems Daughter      Social History     Tobacco Use    Smoking status: Current Every Day Smoker     Packs/day: 0.50     Types: Cigarettes    Smokeless tobacco: Never Used    Tobacco comment: pack every 2 weeks; 1-2 cigs a day   Substance Use Topics    Alcohol " use: Yes     Alcohol/week: 0.0 standard drinks     Comment: once every month or two.    Drug use: Not on file     Review of Systems   Constitutional: Negative for activity change, appetite change and fever.   HENT: Negative for congestion, rhinorrhea, sinus pressure, sinus pain and sore throat.    Eyes: Negative for photophobia and visual disturbance.   Respiratory: Negative for chest tightness, shortness of breath and wheezing.    Cardiovascular: Negative for chest pain.   Gastrointestinal: Negative for abdominal pain, constipation, diarrhea, nausea and vomiting.   Genitourinary: Negative for dysuria.   Musculoskeletal: Positive for arthralgias. Negative for back pain, joint swelling, neck pain and neck stiffness.   Skin: Negative for rash.   Neurological: Negative for dizziness, weakness, light-headedness, numbness and headaches.   Hematological: Does not bruise/bleed easily.       Physical Exam     Initial Vitals [01/27/20 1830]   BP Pulse Resp Temp SpO2   (!) 189/112 100 18 98.2 °F (36.8 °C) 100 %      MAP       --         Physical Exam    Nursing note and vitals reviewed.  Constitutional: He appears well-developed and well-nourished. He is not diaphoretic. No distress.   HENT:   Head: Normocephalic and atraumatic.   Right Ear: External ear normal.   Left Ear: External ear normal.   Mouth/Throat: Oropharynx is clear and moist. No oropharyngeal exudate.   Eyes: EOM are normal.   Neck: Neck supple.   Cardiovascular: Normal rate, regular rhythm and normal heart sounds. Exam reveals no gallop and no friction rub.    No murmur heard.  Pulmonary/Chest: Breath sounds normal. No respiratory distress. He has no wheezes. He has no rhonchi. He has no rales. He exhibits no tenderness.   Musculoskeletal:        Left hip: He exhibits tenderness and bony tenderness. He exhibits normal range of motion, normal strength and no swelling.        Left knee: He exhibits normal range of motion and no swelling. No tenderness found.         Left ankle: He exhibits normal range of motion, no swelling and no ecchymosis. No tenderness.        Cervical back: He exhibits normal range of motion and no tenderness.        Thoracic back: He exhibits normal range of motion and no tenderness.        Lumbar back: He exhibits normal range of motion and no tenderness.        Left upper leg: He exhibits no tenderness, no bony tenderness and no swelling.        Left lower leg: He exhibits no tenderness, no bony tenderness and no swelling.        Legs:  Neurological: He is alert and oriented to person, place, and time.   Skin: Skin is warm and dry.   Psychiatric: He has a normal mood and affect.         ED Course   Procedures  Labs Reviewed - No data to display       Imaging Results    None          Medical Decision Making:   Initial Assessment:   39-year-old male presents for evaluation of 2 week history of left hip and leg pain. Physical exam reveals a nontoxic-appearing male in no acute distress. Patient is afebrile, hypertensive but other vital signs within normal limits.  Neurological exam reveals an alert and oriented patient.  No cranial nerve deficits noted. Patient reports that his blood pressure is high because he stop taking his losartan approximately 1 week ago.  He denies any headache, dizziness or vision changes, neck is supple, no meningeal signs noted. Lungs clear to auscultation bilaterally.  No tenderness to palpation noted over the paraspinal musculature of the spinous processes of the cervical, thoracic or lumbar spine.  Tenderness to palpation noted over the left hip as well as along the line of the left buttocks and into the proximal posterior thigh.  No erythema, edema or ecchymosis noted. No bony instability or crepitus noted.  Full range of motion, sensation and  peripheral pulses intact in lower extremities bilaterally. Patient ambulates well without hesitation or gait abnormality.  Differential Diagnosis:   Hip strain  X-ray ordered to  assess for possible osseous injury including fracture, dislocation or arthralgia.  Sciatica  ED Management:  X-ray report of the left hip reveals no acute findings.  Patient given Toradol for pain control.  Instructed the patient to follow up with his primary care provider for re-evaluation and to return to the emergency department immediately for any new or worsening symptoms. I will refill the patient's losartan, he reports that he has an appointment with his primary care provider in 2 weeks.                                   Clinical Impression:       ICD-10-CM ICD-9-CM   1. Sciatica of left side M54.32 724.3   2. Hip pain M25.559 719.45                             Paola Busby PA-C  01/27/20 1945

## 2020-02-18 ENCOUNTER — OFFICE VISIT (OUTPATIENT)
Dept: FAMILY MEDICINE | Facility: CLINIC | Age: 40
End: 2020-02-18
Payer: COMMERCIAL

## 2020-02-18 ENCOUNTER — TELEPHONE (OUTPATIENT)
Dept: FAMILY MEDICINE | Facility: CLINIC | Age: 40
End: 2020-02-18

## 2020-02-18 ENCOUNTER — LAB VISIT (OUTPATIENT)
Dept: LAB | Facility: HOSPITAL | Age: 40
End: 2020-02-18
Attending: FAMILY MEDICINE
Payer: COMMERCIAL

## 2020-02-18 VITALS
BODY MASS INDEX: 43.67 KG/M2 | OXYGEN SATURATION: 99 % | HEIGHT: 71 IN | WEIGHT: 311.94 LBS | TEMPERATURE: 99 F | DIASTOLIC BLOOD PRESSURE: 106 MMHG | SYSTOLIC BLOOD PRESSURE: 152 MMHG | HEART RATE: 108 BPM

## 2020-02-18 DIAGNOSIS — Z80.0 FAMILY HISTORY OF COLON CANCER: ICD-10-CM

## 2020-02-18 DIAGNOSIS — E78.00 PURE HYPERCHOLESTEROLEMIA: ICD-10-CM

## 2020-02-18 DIAGNOSIS — G47.33 OSA (OBSTRUCTIVE SLEEP APNEA): ICD-10-CM

## 2020-02-18 DIAGNOSIS — I10 HTN (HYPERTENSION), BENIGN: ICD-10-CM

## 2020-02-18 DIAGNOSIS — I10 HTN (HYPERTENSION), BENIGN: Primary | ICD-10-CM

## 2020-02-18 DIAGNOSIS — E78.5 HYPERLIPIDEMIA, UNSPECIFIED HYPERLIPIDEMIA TYPE: ICD-10-CM

## 2020-02-18 DIAGNOSIS — Z72.0 TOBACCO USE: ICD-10-CM

## 2020-02-18 LAB
ALBUMIN SERPL BCP-MCNC: 4.3 G/DL (ref 3.5–5.2)
ALP SERPL-CCNC: 73 U/L (ref 38–126)
ALT SERPL W/O P-5'-P-CCNC: 70 U/L (ref 10–44)
ANION GAP SERPL CALC-SCNC: 8 MMOL/L (ref 8–16)
AST SERPL-CCNC: 43 U/L (ref 15–46)
BASOPHILS # BLD AUTO: 0.04 K/UL (ref 0–0.2)
BASOPHILS NFR BLD: 0.6 % (ref 0–1.9)
BILIRUB SERPL-MCNC: 0.4 MG/DL (ref 0.1–1)
BUN SERPL-MCNC: 16 MG/DL (ref 2–20)
CALCIUM SERPL-MCNC: 9.5 MG/DL (ref 8.7–10.5)
CHLORIDE SERPL-SCNC: 107 MMOL/L (ref 95–110)
CHOLEST SERPL-MCNC: 357 MG/DL (ref 120–199)
CHOLEST/HDLC SERPL: 10.5 {RATIO} (ref 2–5)
CO2 SERPL-SCNC: 28 MMOL/L (ref 23–29)
CREAT SERPL-MCNC: 1.16 MG/DL (ref 0.5–1.4)
DIFFERENTIAL METHOD: ABNORMAL
EOSINOPHIL # BLD AUTO: 0.3 K/UL (ref 0–0.5)
EOSINOPHIL NFR BLD: 4 % (ref 0–8)
ERYTHROCYTE [DISTWIDTH] IN BLOOD BY AUTOMATED COUNT: 14.4 % (ref 11.5–14.5)
EST. GFR  (AFRICAN AMERICAN): >60 ML/MIN/1.73 M^2
EST. GFR  (NON AFRICAN AMERICAN): >60 ML/MIN/1.73 M^2
GLUCOSE SERPL-MCNC: 113 MG/DL (ref 70–110)
HCT VFR BLD AUTO: 47 % (ref 40–54)
HDLC SERPL-MCNC: 34 MG/DL (ref 40–75)
HDLC SERPL: 9.5 % (ref 20–50)
HGB BLD-MCNC: 14.8 G/DL (ref 14–18)
IMM GRANULOCYTES # BLD AUTO: 0.05 K/UL (ref 0–0.04)
IMM GRANULOCYTES NFR BLD AUTO: 0.7 % (ref 0–0.5)
LDLC SERPL CALC-MCNC: ABNORMAL MG/DL (ref 63–159)
LYMPHOCYTES # BLD AUTO: 2.3 K/UL (ref 1–4.8)
LYMPHOCYTES NFR BLD: 33.7 % (ref 18–48)
MCH RBC QN AUTO: 28.7 PG (ref 27–31)
MCHC RBC AUTO-ENTMCNC: 31.5 G/DL (ref 32–36)
MCV RBC AUTO: 91 FL (ref 82–98)
MONOCYTES # BLD AUTO: 0.4 K/UL (ref 0.3–1)
MONOCYTES NFR BLD: 5.2 % (ref 4–15)
NEUTROPHILS # BLD AUTO: 3.8 K/UL (ref 1.8–7.7)
NEUTROPHILS NFR BLD: 55.8 % (ref 38–73)
NONHDLC SERPL-MCNC: 323 MG/DL
NRBC BLD-RTO: 0 /100 WBC
PLATELET # BLD AUTO: 296 K/UL (ref 150–350)
PMV BLD AUTO: 10.4 FL (ref 9.2–12.9)
POTASSIUM SERPL-SCNC: 4 MMOL/L (ref 3.5–5.1)
PROT SERPL-MCNC: 7.9 G/DL (ref 6–8.4)
RBC # BLD AUTO: 5.15 M/UL (ref 4.6–6.2)
SODIUM SERPL-SCNC: 143 MMOL/L (ref 136–145)
TRIGL SERPL-MCNC: 678 MG/DL (ref 30–150)
TSH SERPL DL<=0.005 MIU/L-ACNC: 1.57 UIU/ML (ref 0.4–4)
WBC # BLD AUTO: 6.79 K/UL (ref 3.9–12.7)

## 2020-02-18 PROCEDURE — 3080F PR MOST RECENT DIASTOLIC BLOOD PRESSURE >= 90 MM HG: ICD-10-PCS | Mod: CPTII,S$GLB,, | Performed by: FAMILY MEDICINE

## 2020-02-18 PROCEDURE — 80053 COMPREHEN METABOLIC PANEL: CPT | Mod: PO

## 2020-02-18 PROCEDURE — 3080F DIAST BP >= 90 MM HG: CPT | Mod: CPTII,S$GLB,, | Performed by: FAMILY MEDICINE

## 2020-02-18 PROCEDURE — 90732 PNEUMOCOCCAL POLYSACCHARIDE VACCINE 23-VALENT =>2YO SQ IM: ICD-10-PCS | Mod: S$GLB,,, | Performed by: FAMILY MEDICINE

## 2020-02-18 PROCEDURE — 80061 LIPID PANEL: CPT

## 2020-02-18 PROCEDURE — 3008F PR BODY MASS INDEX (BMI) DOCUMENTED: ICD-10-PCS | Mod: CPTII,S$GLB,, | Performed by: FAMILY MEDICINE

## 2020-02-18 PROCEDURE — 99214 OFFICE O/P EST MOD 30 MIN: CPT | Mod: 25,S$GLB,, | Performed by: FAMILY MEDICINE

## 2020-02-18 PROCEDURE — 36415 COLL VENOUS BLD VENIPUNCTURE: CPT | Mod: PO

## 2020-02-18 PROCEDURE — 90471 PNEUMOCOCCAL POLYSACCHARIDE VACCINE 23-VALENT =>2YO SQ IM: ICD-10-PCS | Mod: S$GLB,,, | Performed by: FAMILY MEDICINE

## 2020-02-18 PROCEDURE — 99214 PR OFFICE/OUTPT VISIT, EST, LEVL IV, 30-39 MIN: ICD-10-PCS | Mod: 25,S$GLB,, | Performed by: FAMILY MEDICINE

## 2020-02-18 PROCEDURE — 90732 PPSV23 VACC 2 YRS+ SUBQ/IM: CPT | Mod: S$GLB,,, | Performed by: FAMILY MEDICINE

## 2020-02-18 PROCEDURE — 3008F BODY MASS INDEX DOCD: CPT | Mod: CPTII,S$GLB,, | Performed by: FAMILY MEDICINE

## 2020-02-18 PROCEDURE — 3077F SYST BP >= 140 MM HG: CPT | Mod: CPTII,S$GLB,, | Performed by: FAMILY MEDICINE

## 2020-02-18 PROCEDURE — 85025 COMPLETE CBC W/AUTO DIFF WBC: CPT | Mod: PO

## 2020-02-18 PROCEDURE — 90471 IMMUNIZATION ADMIN: CPT | Mod: S$GLB,,, | Performed by: FAMILY MEDICINE

## 2020-02-18 PROCEDURE — 3077F PR MOST RECENT SYSTOLIC BLOOD PRESSURE >= 140 MM HG: ICD-10-PCS | Mod: CPTII,S$GLB,, | Performed by: FAMILY MEDICINE

## 2020-02-18 PROCEDURE — 84443 ASSAY THYROID STIM HORMONE: CPT | Mod: PO

## 2020-02-18 RX ORDER — ATORVASTATIN CALCIUM 40 MG/1
40 TABLET, FILM COATED ORAL DAILY
Qty: 90 TABLET | Refills: 0 | Status: SHIPPED | OUTPATIENT
Start: 2020-02-18 | End: 2020-05-06

## 2020-02-18 RX ORDER — AMLODIPINE BESYLATE 5 MG/1
5 TABLET ORAL DAILY
Qty: 30 TABLET | Refills: 11 | Status: SHIPPED | OUTPATIENT
Start: 2020-02-18 | End: 2020-03-02 | Stop reason: SDUPTHER

## 2020-02-18 RX ORDER — PREDNISONE 10 MG/1
TABLET ORAL
Qty: 18 TABLET | Refills: 0 | Status: SHIPPED | OUTPATIENT
Start: 2020-02-18

## 2020-02-18 NOTE — LETTER
February 24, 2020    Rigoberto Villavicenciomaribel Alas  [address]  [place] [address]             [address] [address]  Phone: [phone]  Fax: [phone] Dear Mr. Zurita:    Below are the results from your recent visit:    Resulted Orders   Comprehensive metabolic panel   Result Value Ref Range    Sodium 143 136 - 145 mmol/L    Potassium 4.0 3.5 - 5.1 mmol/L    Chloride 107 95 - 110 mmol/L    CO2 28 23 - 29 mmol/L    Glucose 113 (H) 70 - 110 mg/dL    BUN, Bld 16 2 - 20 mg/dL    Creatinine 1.16 0.50 - 1.40 mg/dL    Calcium 9.5 8.7 - 10.5 mg/dL    Total Protein 7.9 6.0 - 8.4 g/dL    Albumin 4.3 3.5 - 5.2 g/dL    Total Bilirubin 0.4 0.1 - 1.0 mg/dL      Comment:      For infants and newborns, interpretation of results should be based  on gestational age, weight and in agreement with clinical  observations.  Premature Infant recommended reference ranges:  Up to 24 hours.............<8.0 mg/dL  Up to 48 hours............<12.0 mg/dL  3-5 days..................<15.0 mg/dL  6-29 days.................<15.0 mg/dL      Alkaline Phosphatase 73 38 - 126 U/L    AST 43 15 - 46 U/L    ALT 70 (H) 10 - 44 U/L    Anion Gap 8 8 - 16 mmol/L    eGFR if African American >60.0 >60 mL/min/1.73 m^2    eGFR if non African American >60.0 >60 mL/min/1.73 m^2      Comment:      Calculation used to obtain the estimated glomerular filtration  rate (eGFR) is the CKD-EPI equation.      CBC auto differential   Result Value Ref Range    WBC 6.79 3.90 - 12.70 K/uL    RBC 5.15 4.60 - 6.20 M/uL    Hemoglobin 14.8 14.0 - 18.0 g/dL    Hematocrit 47.0 40.0 - 54.0 %    Mean Corpuscular Volume 91 82 - 98 fL    Mean Corpuscular Hemoglobin 28.7 27.0 - 31.0 pg    Mean Corpuscular Hemoglobin Conc 31.5 (L) 32.0 - 36.0 g/dL    RDW 14.4 11.5 - 14.5 %    Platelets 296 150 - 350 K/uL    MPV 10.4 9.2 - 12.9 fL    Immature Granulocytes 0.7 (H) 0.0 - 0.5 %    Gran # (ANC) 3.8 1.8 - 7.7 K/uL    Immature Grans (Abs) 0.05 (H) 0.00 -  0.04 K/uL      Comment:      Mild elevation in immature granulocytes is non specific and   can be seen in a variety of conditions including stress response,   acute inflammation, trauma and pregnancy. Correlation with other   laboratory and clinical findings is essential.      Lymph # 2.3 1.0 - 4.8 K/uL    Mono # 0.4 0.3 - 1.0 K/uL    Eos # 0.3 0.0 - 0.5 K/uL    Baso # 0.04 0.00 - 0.20 K/uL    nRBC 0 0 /100 WBC    Gran% 55.8 38.0 - 73.0 %    Lymph% 33.7 18.0 - 48.0 %    Mono% 5.2 4.0 - 15.0 %    Eosinophil% 4.0 0.0 - 8.0 %    Basophil% 0.6 0.0 - 1.9 %    Differential Method Automated    Lipid panel   Result Value Ref Range    Cholesterol 357 (H) 120 - 199 mg/dL      Comment:      The National Cholesterol Education Program (NCEP) has set the  following guidelines (reference ranges) for Cholesterol:  Optimal.....................<200 mg/dL  Borderline High.............200-239 mg/dL  High........................> or = 240 mg/dL      Triglycerides 678 (H) 30 - 150 mg/dL      Comment:      The National Cholesterol Education Program (NCEP) has set the  following guidelines (reference values) for triglycerides:  Normal......................<150 mg/dL  Borderline High.............150-199 mg/dL  High........................200-499 mg/dL      HDL 34 (L) 40 - 75 mg/dL      Comment:      The National Cholesterol Education Program (NCEP) has set the  following guidelines (reference values) for HDL Cholesterol:  Low...............<40 mg/dL  Optimal...........>60 mg/dL      LDL Cholesterol Invalid, Trig>400.0 63.0 - 159.0 mg/dL      Comment:      The National Cholesterol Education Program (NCEP) has set the  following guidelines (reference values) for LDL Cholesterol:  Optimal.......................<130 mg/dL  Borderline High...............130-159 mg/dL  High..........................160-189 mg/dL  Very High.....................>190 mg/dL      Hdl/Cholesterol Ratio 9.5 (L) 20.0 - 50.0 %    Total Cholesterol/HDL Ratio 10.5 (H) 2.0 -  5.0    Non-HDL Cholesterol 323 mg/dL      Comment:      Risk category and Non-HDL cholesterol goals:  Coronary heart disease (CHD)or equivalent (10-year risk of CHD >20%):  Non-HDL cholesterol goal     <130 mg/dL  Two or more CHD risk factors and 10-year risk of CHD <= 20%:  Non-HDL cholesterol goal     <160 mg/dL  0 to 1 CHD risk factor:  Non-HDL cholesterol goal     <190 mg/dL     TSH   Result Value Ref Range    TSH 1.570 0.400 - 4.000 uIU/mL      Comment:      Warning:  Heterophilic antibodies in serum or plasma of   certain individuals are known to cause interference with   immunoassays. These antibodies may be present in blood samples   from individuals regularly exposed to animal or who have been   treated with animal products.   Patients taking high doses of supplemental biotin may have  negatively biased results.        Your lab results are good except for your cholesterol; which is elevated including triglycerides.  Please restart atorvastatin medication and repeat lab work in three months. Repeat lab orders have already been placed at [facility]. Please get your lab work done by middle of May. .  Please don't hesitate to call our office if you have any questions or concerns.    Sincerely,      Dr.Andrew Saenz

## 2020-02-18 NOTE — PROGRESS NOTES
" Patient ID: Rigoberto Zurita Jr. is a 39 y.o. male.    Chief Complaint: Establish Care and Leg Pain (left)    HPI      Rigoberto Zurita Jr. is a 39 y.o. male pain in left leg feels burning for a few weeks.  Motrin helps and tylenol.  Heat helps at night.  Moving and warms up ok.  No acute trauma.  No ref flags.  Hypertension-not taking medicine this time  Hyperlipidemia-not taking medicine this time  Tobacco use tobacco all-some desire to quit  Obstructive sleep apnea-uses CPAP most of the time-      Vitals:    02/18/20 0848   BP: (!) 152/106   Pulse: 108   Temp: 98.5 °F (36.9 °C)   SpO2: 99%   Weight: (!) 141.5 kg (311 lb 15.2 oz)   Height: 5' 11" (1.803 m)            Review of Symptoms    Constitutional  Neg activity change, No chills /fever   Resp  Neg hemoptysis, stridor, choking  CVS  Neg chest pain, palpitations    Physical Exam    Constitutional:  Oriented to person, place, and time.appears well-developed and well-nourished.  No distress.      HENT  Head: Normocephalic and atraumatic  Right Ear: External ear normal.   Left Ear: External ear normal.   Nose: External nose normal.   Mouth:  Moist mucus membranes.    Eyes:  Conjunctivae are normal. Right eye exhibits no discharge.  Left eye exhibits no discharge. No scleral icterus.  No periorbital edema    Cardiovascular:  Regular rate and rhythm with normal S1 and S2     Pulmonary/Chest:   Clear to auscultation bilaterally without wheezes, rhonchi or rales      Musculoskeletal:  No edema. No obvious deformity No wasting       Neurological:  Alert and oriented to person, place, and time.   Coordination normal.     Skin:   Skin is warm and dry.  No diaphoresis.   No rash noted.     Psychiatric: Normal mood and affect. Behavior is normal.  Judgment and thought content normal.     Complete Blood Count  Lab Results   Component Value Date    RBC 5.15 02/18/2020    HGB 14.8 02/18/2020    HCT 47.0 02/18/2020    MCV 91 02/18/2020    MCH 28.7 02/18/2020    MCHC 31.5 (L) " 02/18/2020    RDW 14.4 02/18/2020     02/18/2020    MPV 10.4 02/18/2020    GRAN 3.8 02/18/2020    GRAN 55.8 02/18/2020    LYMPH 2.3 02/18/2020    LYMPH 33.7 02/18/2020    MONO 0.4 02/18/2020    MONO 5.2 02/18/2020    EOS 0.3 02/18/2020    BASO 0.04 02/18/2020    EOSINOPHIL 4.0 02/18/2020    BASOPHIL 0.6 02/18/2020    DIFFMETHOD Automated 02/18/2020       Comprehensive Metabolic Panel  Lab Results   Component Value Date     (H) 02/18/2020    BUN 16 02/18/2020    CREATININE 1.16 02/18/2020     02/18/2020    K 4.0 02/18/2020     02/18/2020    PROT 7.9 02/18/2020    ALBUMIN 4.3 02/18/2020    BILITOT 0.4 02/18/2020    AST 43 02/18/2020    ALKPHOS 73 02/18/2020    CO2 28 02/18/2020    ALT 70 (H) 02/18/2020    ANIONGAP 8 02/18/2020    EGFRNONAA >60.0 02/18/2020    ESTGFRAFRICA >60.0 02/18/2020       TSH  Lab Results   Component Value Date    TSH 1.570 02/18/2020       Assessment / Plan:      ICD-10-CM ICD-9-CM   1. HTN (hypertension), benign I10 401.1   2. ANN MARIE (obstructive sleep apnea) G47.33 327.23   3. Pure hypercholesterolemia E78.00 272.0   4. Family history of colon cancer Z80.0 V16.0   5. Tobacco use Z72.0 305.1     HTN (hypertension), benign  -     Comprehensive metabolic panel; Future; Expected date: 02/18/2020  -     CBC auto differential; Future; Expected date: 02/18/2020  -     Lipid panel; Future; Expected date: 02/18/2020  -     TSH; Future; Expected date: 02/18/2020    ANN MARIE (obstructive sleep apnea)  -     Comprehensive metabolic panel; Future; Expected date: 02/18/2020  -     CBC auto differential; Future; Expected date: 02/18/2020  -     Lipid panel; Future; Expected date: 02/18/2020  -     TSH; Future; Expected date: 02/18/2020    Pure hypercholesterolemia  -     Comprehensive metabolic panel; Future; Expected date: 02/18/2020  -     CBC auto differential; Future; Expected date: 02/18/2020  -     Lipid panel; Future; Expected date: 02/18/2020  -     TSH; Future; Expected date:  02/18/2020    Family history of colon cancer    Tobacco use  -     Pneumococcal Polysaccharide Vaccine (23 Valent) (SQ/IM)  -     Ambulatory referral/consult to Smoking Cessation Program; Future; Expected date: 02/25/2020    Other orders  -     amLODIPine (NORVASC) 5 MG tablet; Take 1 tablet (5 mg total) by mouth once daily.  Dispense: 30 tablet; Refill: 11  -     atorvastatin (LIPITOR) 40 MG tablet; Take 1 tablet (40 mg total) by mouth once daily.  Dispense: 90 tablet; Refill: 0  -     predniSONE (DELTASONE) 10 MG tablet; Three for three days two for three days then one for three days  Dispense: 18 tablet; Refill: 0

## 2020-02-19 NOTE — TELEPHONE ENCOUNTER
Your lab work overall is good except for your cholesterol which is elevated including triglycerides.  Restart the atorvastatin medication-check lab work in three months.  I will order again for you.    So please get your lab work done by middle of May

## 2020-02-21 ENCOUNTER — PATIENT MESSAGE (OUTPATIENT)
Dept: FAMILY MEDICINE | Facility: CLINIC | Age: 40
End: 2020-02-21

## 2020-03-02 ENCOUNTER — TELEPHONE (OUTPATIENT)
Dept: FAMILY MEDICINE | Facility: CLINIC | Age: 40
End: 2020-03-02

## 2020-03-02 ENCOUNTER — CLINICAL SUPPORT (OUTPATIENT)
Dept: FAMILY MEDICINE | Facility: CLINIC | Age: 40
End: 2020-03-02
Payer: COMMERCIAL

## 2020-03-02 VITALS — DIASTOLIC BLOOD PRESSURE: 92 MMHG | OXYGEN SATURATION: 96 % | SYSTOLIC BLOOD PRESSURE: 142 MMHG | HEART RATE: 110 BPM

## 2020-03-02 DIAGNOSIS — I10 HTN (HYPERTENSION), BENIGN: Primary | ICD-10-CM

## 2020-03-02 RX ORDER — AMLODIPINE BESYLATE 10 MG/1
10 TABLET ORAL DAILY
Qty: 90 TABLET | Refills: 3 | Status: SHIPPED | OUTPATIENT
Start: 2020-03-02 | End: 2021-11-22 | Stop reason: SDUPTHER

## 2020-03-02 NOTE — PROGRESS NOTES
Rigoberto Zurita Jr. 39 y.o. male is here today for Blood Pressure check.   History of HTN yes.    Review of patient's allergies indicates:  No Known Allergies  Creatinine   Date Value Ref Range Status   02/18/2020 1.16 0.50 - 1.40 mg/dL Final     Sodium   Date Value Ref Range Status   02/18/2020 143 136 - 145 mmol/L Final     Potassium   Date Value Ref Range Status   02/18/2020 4.0 3.5 - 5.1 mmol/L Final   ]  Patient verifies taking blood pressure medications on a regular basis at the same time of the day.     Current Outpatient Medications:     amLODIPine (NORVASC) 10 MG tablet, Take 1 tablet (10 mg total) by mouth once daily. DC 5 milligram does, Disp: 90 tablet, Rfl: 3    atorvastatin (LIPITOR) 40 MG tablet, Take 1 tablet (40 mg total) by mouth once daily., Disp: 90 tablet, Rfl: 0    predniSONE (DELTASONE) 10 MG tablet, Three for three days two for three days then one for three days, Disp: 18 tablet, Rfl: 0  Does patient have record of home blood pressure readings no.   Last dose of blood pressure medication was taken at 720 am.  Patient is asymptomatic.       ,   .    Blood pressure reading after 15 minutes was 142/92, Pulse 110.  Dr. Soler notified and increased norvasc to 10 mg winter  Pt notified and rechecked for bp check in 3 weeks

## 2020-03-19 ENCOUNTER — TELEPHONE (OUTPATIENT)
Dept: FAMILY MEDICINE | Facility: CLINIC | Age: 40
End: 2020-03-19

## 2020-03-19 NOTE — TELEPHONE ENCOUNTER
Patient scheduled for a virtual visit on tomorrow at 11 am with Dr. Saenz.       ----- Message from Ame Esteves sent at 3/19/2020 12:56 PM CDT -----  Contact: wife [phone] Sonal  Patient has had a persistent cough for the past month and would like to be seen. He has a nurse visit scheduled for the 25th but wants to come in sooner. Please call and advise.

## 2020-03-20 ENCOUNTER — OFFICE VISIT (OUTPATIENT)
Dept: FAMILY MEDICINE | Facility: CLINIC | Age: 40
End: 2020-03-20
Payer: COMMERCIAL

## 2020-03-20 DIAGNOSIS — J30.1 ALLERGIC RHINITIS DUE TO POLLEN, UNSPECIFIED SEASONALITY: Primary | ICD-10-CM

## 2020-03-20 DIAGNOSIS — I10 HTN (HYPERTENSION), BENIGN: ICD-10-CM

## 2020-03-20 PROCEDURE — 99213 PR OFFICE/OUTPT VISIT, EST, LEVL III, 20-29 MIN: ICD-10-PCS | Mod: 95,,, | Performed by: FAMILY MEDICINE

## 2020-03-20 PROCEDURE — 99213 OFFICE O/P EST LOW 20 MIN: CPT | Mod: 95,,, | Performed by: FAMILY MEDICINE

## 2020-03-20 NOTE — PROGRESS NOTES
The patient location is: home   The chief complaint leading to consultation is:  Cough   Visit type: Virtual visit with synchronous audio and video  Total time spent with patient:  10  Each patient to whom he or she provides medical services by telemedicine is:  (1) informed of the relationship between the physician and patient and the respective role of any other health care provider with respect to management of the patient; and (2) notified that he or she may decline to receive medical services by telemedicine and may withdraw from such care at any time.       Patient ID: Rigoberto Zurita Jr. is a 39 y.o. male.    Chief Complaint: Cough    HPI       Rigoberto Zurita Jr. is a 39 y.o. male video visit for cough.  Patient with cough for about 2-3 weeks.  Mild irritating occasionally gets up a small amount mucus.  No fever chills.  No nausea associated with this.  Taking TheraFlu at nighttime.  Symptoms are a little bit better now than they were previously.    Patient with increased dose of amlodipine from 5 milligrams to 10 milligrams.  Not causing any problems at this time.  No ankle or lower leg swelling.  Please blood pressure is normal.      Review of Symptoms    Constitutional  No change in activity, No chills fever   Resp  Neg hemoptysis, stridor, choking  CVS  Neg chest pain, palpitations    There were no vitals taken for this visit.    Physical Exam    There were no vitals filed for this visit.    Constitutional:   Oriented to person, place, and time.appears well-developed and well-nourished.   No distress.      Psychiatric: Normal mood and affect. Behavior is normal. Judgment and thought content normal.     Complete Blood Count  Lab Results   Component Value Date    RBC 5.15 02/18/2020    HGB 14.8 02/18/2020    HCT 47.0 02/18/2020    MCV 91 02/18/2020    MCH 28.7 02/18/2020    MCHC 31.5 (L) 02/18/2020    RDW 14.4 02/18/2020     02/18/2020    MPV 10.4 02/18/2020    GRAN 3.8 02/18/2020    GRAN 55.8  02/18/2020    LYMPH 2.3 02/18/2020    LYMPH 33.7 02/18/2020    MONO 0.4 02/18/2020    MONO 5.2 02/18/2020    EOS 0.3 02/18/2020    BASO 0.04 02/18/2020    EOSINOPHIL 4.0 02/18/2020    BASOPHIL 0.6 02/18/2020    DIFFMETHOD Automated 02/18/2020       Comprehensive Metabolic Panel  Lab Results   Component Value Date     (H) 02/18/2020    BUN 16 02/18/2020    CREATININE 1.16 02/18/2020     02/18/2020    K 4.0 02/18/2020     02/18/2020    PROT 7.9 02/18/2020    ALBUMIN 4.3 02/18/2020    BILITOT 0.4 02/18/2020    AST 43 02/18/2020    ALKPHOS 73 02/18/2020    CO2 28 02/18/2020    ALT 70 (H) 02/18/2020    ANIONGAP 8 02/18/2020    EGFRNONAA >60.0 02/18/2020    ESTGFRAFRICA >60.0 02/18/2020       TSH  Lab Results   Component Value Date    TSH 1.570 02/18/2020       Assessment / Plan:      ICD-10-CM ICD-9-CM   1. Allergic rhinitis due to pollen, unspecified seasonality J30.1 477.0   2. HTN (hypertension), benign I10 401.1     Allergic rhinitis due to pollen, unspecified seasonality    HTN (hypertension), benign      Regarding cough probably allergic in nature-suggested Claritin and may be Mucinex    Hypertension-continue current medication please check blood pressure in send through portal message thing.  He believes blood pressure is below 140 in below 90 most of the time.

## 2020-05-06 RX ORDER — ATORVASTATIN CALCIUM 40 MG/1
TABLET, FILM COATED ORAL
Qty: 90 TABLET | Refills: 0 | Status: SHIPPED | OUTPATIENT
Start: 2020-05-06

## 2020-07-08 ENCOUNTER — LAB VISIT (OUTPATIENT)
Dept: PRIMARY CARE CLINIC | Facility: OTHER | Age: 40
End: 2020-07-08
Attending: INTERNAL MEDICINE
Payer: COMMERCIAL

## 2020-07-08 DIAGNOSIS — Z03.818 ENCOUNTER FOR OBSERVATION FOR SUSPECTED EXPOSURE TO OTHER BIOLOGICAL AGENTS RULED OUT: ICD-10-CM

## 2020-07-08 PROCEDURE — U0003 INFECTIOUS AGENT DETECTION BY NUCLEIC ACID (DNA OR RNA); SEVERE ACUTE RESPIRATORY SYNDROME CORONAVIRUS 2 (SARS-COV-2) (CORONAVIRUS DISEASE [COVID-19]), AMPLIFIED PROBE TECHNIQUE, MAKING USE OF HIGH THROUGHPUT TECHNOLOGIES AS DESCRIBED BY CMS-2020-01-R: HCPCS

## 2020-07-11 LAB — SARS-COV-2 RNA RESP QL NAA+PROBE: NEGATIVE

## 2020-08-06 ENCOUNTER — LAB VISIT (OUTPATIENT)
Dept: LAB | Facility: OTHER | Age: 40
End: 2020-08-06
Payer: COMMERCIAL

## 2020-08-06 DIAGNOSIS — Z03.818 ENCOUNTER FOR OBSERVATION FOR SUSPECTED EXPOSURE TO OTHER BIOLOGICAL AGENTS RULED OUT: ICD-10-CM

## 2020-08-06 PROCEDURE — U0003 INFECTIOUS AGENT DETECTION BY NUCLEIC ACID (DNA OR RNA); SEVERE ACUTE RESPIRATORY SYNDROME CORONAVIRUS 2 (SARS-COV-2) (CORONAVIRUS DISEASE [COVID-19]), AMPLIFIED PROBE TECHNIQUE, MAKING USE OF HIGH THROUGHPUT TECHNOLOGIES AS DESCRIBED BY CMS-2020-01-R: HCPCS

## 2020-08-10 LAB — SARS-COV-2 RNA RESP QL NAA+PROBE: NORMAL

## 2020-09-03 ENCOUNTER — LAB VISIT (OUTPATIENT)
Dept: PRIMARY CARE CLINIC | Facility: OTHER | Age: 40
End: 2020-09-03
Payer: COMMERCIAL

## 2020-09-03 DIAGNOSIS — Z03.818 ENCOUNTER FOR OBSERVATION FOR SUSPECTED EXPOSURE TO OTHER BIOLOGICAL AGENTS RULED OUT: ICD-10-CM

## 2020-09-03 PROCEDURE — U0003 INFECTIOUS AGENT DETECTION BY NUCLEIC ACID (DNA OR RNA); SEVERE ACUTE RESPIRATORY SYNDROME CORONAVIRUS 2 (SARS-COV-2) (CORONAVIRUS DISEASE [COVID-19]), AMPLIFIED PROBE TECHNIQUE, MAKING USE OF HIGH THROUGHPUT TECHNOLOGIES AS DESCRIBED BY CMS-2020-01-R: HCPCS

## 2020-09-04 LAB — SARS-COV-2 RNA RESP QL NAA+PROBE: NOT DETECTED

## 2020-10-01 ENCOUNTER — LAB VISIT (OUTPATIENT)
Dept: PRIMARY CARE CLINIC | Facility: OTHER | Age: 40
End: 2020-10-01
Payer: COMMERCIAL

## 2020-10-01 DIAGNOSIS — Z03.818 ENCOUNTER FOR OBSERVATION FOR SUSPECTED EXPOSURE TO OTHER BIOLOGICAL AGENTS RULED OUT: ICD-10-CM

## 2020-10-01 PROCEDURE — U0003 INFECTIOUS AGENT DETECTION BY NUCLEIC ACID (DNA OR RNA); SEVERE ACUTE RESPIRATORY SYNDROME CORONAVIRUS 2 (SARS-COV-2) (CORONAVIRUS DISEASE [COVID-19]), AMPLIFIED PROBE TECHNIQUE, MAKING USE OF HIGH THROUGHPUT TECHNOLOGIES AS DESCRIBED BY CMS-2020-01-R: HCPCS

## 2020-10-02 LAB — SARS-COV-2 RNA RESP QL NAA+PROBE: NOT DETECTED

## 2020-10-20 ENCOUNTER — LAB VISIT (OUTPATIENT)
Dept: PRIMARY CARE CLINIC | Facility: OTHER | Age: 40
End: 2020-10-20
Payer: COMMERCIAL

## 2020-10-20 DIAGNOSIS — Z03.818 ENCOUNTER FOR OBSERVATION FOR SUSPECTED EXPOSURE TO OTHER BIOLOGICAL AGENTS RULED OUT: ICD-10-CM

## 2020-10-20 PROCEDURE — U0003 INFECTIOUS AGENT DETECTION BY NUCLEIC ACID (DNA OR RNA); SEVERE ACUTE RESPIRATORY SYNDROME CORONAVIRUS 2 (SARS-COV-2) (CORONAVIRUS DISEASE [COVID-19]), AMPLIFIED PROBE TECHNIQUE, MAKING USE OF HIGH THROUGHPUT TECHNOLOGIES AS DESCRIBED BY CMS-2020-01-R: HCPCS

## 2020-10-22 LAB — SARS-COV-2 RNA RESP QL NAA+PROBE: NOT DETECTED

## 2020-11-13 ENCOUNTER — TELEPHONE (OUTPATIENT)
Dept: PRIMARY CARE CLINIC | Facility: CLINIC | Age: 40
End: 2020-11-13

## 2020-11-17 ENCOUNTER — LAB VISIT (OUTPATIENT)
Dept: PRIMARY CARE CLINIC | Facility: OTHER | Age: 40
End: 2020-11-17
Payer: COMMERCIAL

## 2020-11-17 DIAGNOSIS — Z03.818 ENCOUNTER FOR OBSERVATION FOR SUSPECTED EXPOSURE TO OTHER BIOLOGICAL AGENTS RULED OUT: ICD-10-CM

## 2020-11-17 PROCEDURE — U0003 INFECTIOUS AGENT DETECTION BY NUCLEIC ACID (DNA OR RNA); SEVERE ACUTE RESPIRATORY SYNDROME CORONAVIRUS 2 (SARS-COV-2) (CORONAVIRUS DISEASE [COVID-19]), AMPLIFIED PROBE TECHNIQUE, MAKING USE OF HIGH THROUGHPUT TECHNOLOGIES AS DESCRIBED BY CMS-2020-01-R: HCPCS

## 2020-11-20 LAB — SARS-COV-2 RNA RESP QL NAA+PROBE: NOT DETECTED

## 2020-12-15 ENCOUNTER — LAB VISIT (OUTPATIENT)
Dept: PRIMARY CARE CLINIC | Facility: OTHER | Age: 40
End: 2020-12-15
Payer: COMMERCIAL

## 2020-12-15 DIAGNOSIS — Z03.818 ENCOUNTER FOR OBSERVATION FOR SUSPECTED EXPOSURE TO OTHER BIOLOGICAL AGENTS RULED OUT: ICD-10-CM

## 2020-12-15 PROCEDURE — U0003 INFECTIOUS AGENT DETECTION BY NUCLEIC ACID (DNA OR RNA); SEVERE ACUTE RESPIRATORY SYNDROME CORONAVIRUS 2 (SARS-COV-2) (CORONAVIRUS DISEASE [COVID-19]), AMPLIFIED PROBE TECHNIQUE, MAKING USE OF HIGH THROUGHPUT TECHNOLOGIES AS DESCRIBED BY CMS-2020-01-R: HCPCS

## 2020-12-18 LAB — SARS-COV-2 RNA RESP QL NAA+PROBE: NOT DETECTED

## 2021-01-12 ENCOUNTER — LAB VISIT (OUTPATIENT)
Dept: PRIMARY CARE CLINIC | Facility: OTHER | Age: 41
End: 2021-01-12
Attending: INTERNAL MEDICINE
Payer: COMMERCIAL

## 2021-01-12 DIAGNOSIS — Z20.822 ENCOUNTER FOR LABORATORY TESTING FOR COVID-19 VIRUS: ICD-10-CM

## 2021-01-12 PROCEDURE — U0003 INFECTIOUS AGENT DETECTION BY NUCLEIC ACID (DNA OR RNA); SEVERE ACUTE RESPIRATORY SYNDROME CORONAVIRUS 2 (SARS-COV-2) (CORONAVIRUS DISEASE [COVID-19]), AMPLIFIED PROBE TECHNIQUE, MAKING USE OF HIGH THROUGHPUT TECHNOLOGIES AS DESCRIBED BY CMS-2020-01-R: HCPCS

## 2021-01-13 LAB — SARS-COV-2 RNA RESP QL NAA+PROBE: NOT DETECTED

## 2021-01-23 ENCOUNTER — IMMUNIZATION (OUTPATIENT)
Dept: INTERNAL MEDICINE | Facility: CLINIC | Age: 41
End: 2021-01-23
Payer: COMMERCIAL

## 2021-01-23 DIAGNOSIS — Z23 NEED FOR VACCINATION: Primary | ICD-10-CM

## 2021-01-23 PROCEDURE — 91300 COVID-19, MRNA, LNP-S, PF, 30 MCG/0.3 ML DOSE VACCINE: ICD-10-PCS | Mod: ,,, | Performed by: INTERNAL MEDICINE

## 2021-01-23 PROCEDURE — 0001A COVID-19, MRNA, LNP-S, PF, 30 MCG/0.3 ML DOSE VACCINE: CPT | Mod: CV19,,, | Performed by: INTERNAL MEDICINE

## 2021-01-23 PROCEDURE — 0001A COVID-19, MRNA, LNP-S, PF, 30 MCG/0.3 ML DOSE VACCINE: ICD-10-PCS | Mod: CV19,,, | Performed by: INTERNAL MEDICINE

## 2021-01-23 PROCEDURE — 91300 COVID-19, MRNA, LNP-S, PF, 30 MCG/0.3 ML DOSE VACCINE: CPT | Mod: ,,, | Performed by: INTERNAL MEDICINE

## 2021-02-09 ENCOUNTER — LAB VISIT (OUTPATIENT)
Dept: PRIMARY CARE CLINIC | Facility: OTHER | Age: 41
End: 2021-02-09
Payer: COMMERCIAL

## 2021-02-09 DIAGNOSIS — Z20.822 ENCOUNTER FOR LABORATORY TESTING FOR COVID-19 VIRUS: ICD-10-CM

## 2021-02-09 PROCEDURE — U0003 INFECTIOUS AGENT DETECTION BY NUCLEIC ACID (DNA OR RNA); SEVERE ACUTE RESPIRATORY SYNDROME CORONAVIRUS 2 (SARS-COV-2) (CORONAVIRUS DISEASE [COVID-19]), AMPLIFIED PROBE TECHNIQUE, MAKING USE OF HIGH THROUGHPUT TECHNOLOGIES AS DESCRIBED BY CMS-2020-01-R: HCPCS

## 2021-02-10 LAB — SARS-COV-2 RNA RESP QL NAA+PROBE: NOT DETECTED

## 2021-02-13 ENCOUNTER — IMMUNIZATION (OUTPATIENT)
Dept: INTERNAL MEDICINE | Facility: CLINIC | Age: 41
End: 2021-02-13
Payer: COMMERCIAL

## 2021-02-13 DIAGNOSIS — Z23 NEED FOR VACCINATION: Primary | ICD-10-CM

## 2021-02-13 PROCEDURE — 91300 COVID-19, MRNA, LNP-S, PF, 30 MCG/0.3 ML DOSE VACCINE: CPT | Mod: PBBFAC | Performed by: INTERNAL MEDICINE

## 2021-02-13 PROCEDURE — 0002A COVID-19, MRNA, LNP-S, PF, 30 MCG/0.3 ML DOSE VACCINE: CPT | Mod: PBBFAC | Performed by: INTERNAL MEDICINE

## 2021-03-09 ENCOUNTER — LAB VISIT (OUTPATIENT)
Dept: PRIMARY CARE CLINIC | Facility: OTHER | Age: 41
End: 2021-03-09
Payer: COMMERCIAL

## 2021-03-09 DIAGNOSIS — Z20.822 ENCOUNTER FOR LABORATORY TESTING FOR COVID-19 VIRUS: ICD-10-CM

## 2021-03-09 PROCEDURE — U0003 INFECTIOUS AGENT DETECTION BY NUCLEIC ACID (DNA OR RNA); SEVERE ACUTE RESPIRATORY SYNDROME CORONAVIRUS 2 (SARS-COV-2) (CORONAVIRUS DISEASE [COVID-19]), AMPLIFIED PROBE TECHNIQUE, MAKING USE OF HIGH THROUGHPUT TECHNOLOGIES AS DESCRIBED BY CMS-2020-01-R: HCPCS | Performed by: INTERNAL MEDICINE

## 2021-03-10 LAB — SARS-COV-2 RNA RESP QL NAA+PROBE: NOT DETECTED

## 2021-04-05 ENCOUNTER — LAB VISIT (OUTPATIENT)
Dept: PRIMARY CARE CLINIC | Facility: OTHER | Age: 41
End: 2021-04-05
Payer: COMMERCIAL

## 2021-04-05 ENCOUNTER — PATIENT MESSAGE (OUTPATIENT)
Dept: ADMINISTRATIVE | Facility: HOSPITAL | Age: 41
End: 2021-04-05

## 2021-04-05 DIAGNOSIS — Z20.822 ENCOUNTER FOR LABORATORY TESTING FOR COVID-19 VIRUS: ICD-10-CM

## 2021-04-05 PROCEDURE — U0003 INFECTIOUS AGENT DETECTION BY NUCLEIC ACID (DNA OR RNA); SEVERE ACUTE RESPIRATORY SYNDROME CORONAVIRUS 2 (SARS-COV-2) (CORONAVIRUS DISEASE [COVID-19]), AMPLIFIED PROBE TECHNIQUE, MAKING USE OF HIGH THROUGHPUT TECHNOLOGIES AS DESCRIBED BY CMS-2020-01-R: HCPCS | Performed by: INTERNAL MEDICINE

## 2021-04-07 LAB — SARS-COV-2 RNA RESP QL NAA+PROBE: NOT DETECTED

## 2021-05-03 ENCOUNTER — LAB VISIT (OUTPATIENT)
Dept: PRIMARY CARE CLINIC | Facility: OTHER | Age: 41
End: 2021-05-03
Payer: COMMERCIAL

## 2021-05-03 DIAGNOSIS — Z20.822 ENCOUNTER FOR LABORATORY TESTING FOR COVID-19 VIRUS: ICD-10-CM

## 2021-05-03 PROCEDURE — U0003 INFECTIOUS AGENT DETECTION BY NUCLEIC ACID (DNA OR RNA); SEVERE ACUTE RESPIRATORY SYNDROME CORONAVIRUS 2 (SARS-COV-2) (CORONAVIRUS DISEASE [COVID-19]), AMPLIFIED PROBE TECHNIQUE, MAKING USE OF HIGH THROUGHPUT TECHNOLOGIES AS DESCRIBED BY CMS-2020-01-R: HCPCS

## 2021-05-04 LAB — SARS-COV-2 RNA RESP QL NAA+PROBE: NOT DETECTED

## 2021-06-14 ENCOUNTER — LAB VISIT (OUTPATIENT)
Dept: PRIMARY CARE CLINIC | Facility: OTHER | Age: 41
End: 2021-06-14
Payer: COMMERCIAL

## 2021-06-14 DIAGNOSIS — Z20.822 ENCOUNTER FOR LABORATORY TESTING FOR COVID-19 VIRUS: ICD-10-CM

## 2021-06-14 PROCEDURE — U0003 INFECTIOUS AGENT DETECTION BY NUCLEIC ACID (DNA OR RNA); SEVERE ACUTE RESPIRATORY SYNDROME CORONAVIRUS 2 (SARS-COV-2) (CORONAVIRUS DISEASE [COVID-19]), AMPLIFIED PROBE TECHNIQUE, MAKING USE OF HIGH THROUGHPUT TECHNOLOGIES AS DESCRIBED BY CMS-2020-01-R: HCPCS | Performed by: INTERNAL MEDICINE

## 2021-06-15 LAB — SARS-COV-2 RNA RESP QL NAA+PROBE: NOT DETECTED

## 2021-07-06 ENCOUNTER — PATIENT MESSAGE (OUTPATIENT)
Dept: ADMINISTRATIVE | Facility: HOSPITAL | Age: 41
End: 2021-07-06

## 2021-07-13 ENCOUNTER — LAB VISIT (OUTPATIENT)
Dept: PRIMARY CARE CLINIC | Facility: OTHER | Age: 41
End: 2021-07-13
Payer: COMMERCIAL

## 2021-07-13 DIAGNOSIS — Z20.822 ENCOUNTER FOR LABORATORY TESTING FOR COVID-19 VIRUS: ICD-10-CM

## 2021-07-13 LAB
SARS-COV-2 RNA RESP QL NAA+PROBE: NOT DETECTED
SARS-COV-2- CYCLE NUMBER: -1

## 2021-07-13 PROCEDURE — U0003 INFECTIOUS AGENT DETECTION BY NUCLEIC ACID (DNA OR RNA); SEVERE ACUTE RESPIRATORY SYNDROME CORONAVIRUS 2 (SARS-COV-2) (CORONAVIRUS DISEASE [COVID-19]), AMPLIFIED PROBE TECHNIQUE, MAKING USE OF HIGH THROUGHPUT TECHNOLOGIES AS DESCRIBED BY CMS-2020-01-R: HCPCS | Performed by: INTERNAL MEDICINE

## 2021-08-02 ENCOUNTER — PATIENT OUTREACH (OUTPATIENT)
Dept: ADMINISTRATIVE | Facility: OTHER | Age: 41
End: 2021-08-02

## 2021-08-03 ENCOUNTER — OFFICE VISIT (OUTPATIENT)
Dept: OPTOMETRY | Facility: CLINIC | Age: 41
End: 2021-08-03
Payer: COMMERCIAL

## 2021-08-03 DIAGNOSIS — H52.13 MYOPIA WITH ASTIGMATISM, BILATERAL: Primary | ICD-10-CM

## 2021-08-03 DIAGNOSIS — H52.203 MYOPIA WITH ASTIGMATISM, BILATERAL: Primary | ICD-10-CM

## 2021-08-03 PROCEDURE — 92004 PR EYE EXAM, NEW PATIENT,COMPREHESV: ICD-10-PCS | Mod: S$GLB,,, | Performed by: OPTOMETRIST

## 2021-08-03 PROCEDURE — 99999 PR PBB SHADOW E&M-EST. PATIENT-LVL II: CPT | Mod: PBBFAC,,, | Performed by: OPTOMETRIST

## 2021-08-03 PROCEDURE — 99999 PR PBB SHADOW E&M-EST. PATIENT-LVL II: ICD-10-PCS | Mod: PBBFAC,,, | Performed by: OPTOMETRIST

## 2021-08-03 PROCEDURE — 92004 COMPRE OPH EXAM NEW PT 1/>: CPT | Mod: S$GLB,,, | Performed by: OPTOMETRIST

## 2021-08-03 PROCEDURE — 92015 DETERMINE REFRACTIVE STATE: CPT | Mod: S$GLB,,, | Performed by: OPTOMETRIST

## 2021-08-03 PROCEDURE — 92015 PR REFRACTION: ICD-10-PCS | Mod: S$GLB,,, | Performed by: OPTOMETRIST

## 2021-08-09 ENCOUNTER — LAB VISIT (OUTPATIENT)
Dept: PRIMARY CARE CLINIC | Facility: OTHER | Age: 41
End: 2021-08-09
Payer: COMMERCIAL

## 2021-08-09 DIAGNOSIS — Z20.822 ENCOUNTER FOR LABORATORY TESTING FOR COVID-19 VIRUS: ICD-10-CM

## 2021-08-09 PROCEDURE — U0003 INFECTIOUS AGENT DETECTION BY NUCLEIC ACID (DNA OR RNA); SEVERE ACUTE RESPIRATORY SYNDROME CORONAVIRUS 2 (SARS-COV-2) (CORONAVIRUS DISEASE [COVID-19]), AMPLIFIED PROBE TECHNIQUE, MAKING USE OF HIGH THROUGHPUT TECHNOLOGIES AS DESCRIBED BY CMS-2020-01-R: HCPCS

## 2021-08-12 LAB
SARS-COV-2 RNA RESP QL NAA+PROBE: NORMAL
TEST PERFORMANCE INFO SPEC: NORMAL

## 2021-09-20 ENCOUNTER — LAB VISIT (OUTPATIENT)
Dept: PRIMARY CARE CLINIC | Facility: OTHER | Age: 41
End: 2021-09-20
Payer: COMMERCIAL

## 2021-09-20 DIAGNOSIS — Z20.822 ENCOUNTER FOR LABORATORY TESTING FOR COVID-19 VIRUS: ICD-10-CM

## 2021-09-20 PROCEDURE — U0003 INFECTIOUS AGENT DETECTION BY NUCLEIC ACID (DNA OR RNA); SEVERE ACUTE RESPIRATORY SYNDROME CORONAVIRUS 2 (SARS-COV-2) (CORONAVIRUS DISEASE [COVID-19]), AMPLIFIED PROBE TECHNIQUE, MAKING USE OF HIGH THROUGHPUT TECHNOLOGIES AS DESCRIBED BY CMS-2020-01-R: HCPCS | Performed by: INTERNAL MEDICINE

## 2021-09-21 LAB
SARS-COV-2 RNA RESP QL NAA+PROBE: NOT DETECTED
SARS-COV-2- CYCLE NUMBER: NORMAL

## 2021-11-01 ENCOUNTER — LAB VISIT (OUTPATIENT)
Dept: PRIMARY CARE CLINIC | Facility: OTHER | Age: 41
End: 2021-11-01
Payer: COMMERCIAL

## 2021-11-01 DIAGNOSIS — Z20.822 ENCOUNTER FOR LABORATORY TESTING FOR COVID-19 VIRUS: ICD-10-CM

## 2021-11-01 LAB
SARS-COV-2 RNA RESP QL NAA+PROBE: NOT DETECTED
SARS-COV-2- CYCLE NUMBER: NORMAL

## 2021-11-01 PROCEDURE — U0003 INFECTIOUS AGENT DETECTION BY NUCLEIC ACID (DNA OR RNA); SEVERE ACUTE RESPIRATORY SYNDROME CORONAVIRUS 2 (SARS-COV-2) (CORONAVIRUS DISEASE [COVID-19]), AMPLIFIED PROBE TECHNIQUE, MAKING USE OF HIGH THROUGHPUT TECHNOLOGIES AS DESCRIBED BY CMS-2020-01-R: HCPCS | Performed by: INTERNAL MEDICINE

## 2021-11-22 ENCOUNTER — OFFICE VISIT (OUTPATIENT)
Dept: INTERNAL MEDICINE | Facility: CLINIC | Age: 41
End: 2021-11-22
Payer: COMMERCIAL

## 2021-11-22 VITALS
SYSTOLIC BLOOD PRESSURE: 140 MMHG | BODY MASS INDEX: 43.89 KG/M2 | OXYGEN SATURATION: 99 % | HEIGHT: 71 IN | DIASTOLIC BLOOD PRESSURE: 102 MMHG | WEIGHT: 313.5 LBS | HEART RATE: 90 BPM

## 2021-11-22 DIAGNOSIS — I10 HTN (HYPERTENSION), BENIGN: ICD-10-CM

## 2021-11-22 DIAGNOSIS — Z72.0 TOBACCO USE: Primary | ICD-10-CM

## 2021-11-22 DIAGNOSIS — R05.9 COUGH: ICD-10-CM

## 2021-11-22 LAB
SARS-COV-2 RNA RESP QL NAA+PROBE: NOT DETECTED
SARS-COV-2- CYCLE NUMBER: NORMAL

## 2021-11-22 PROCEDURE — U0003 INFECTIOUS AGENT DETECTION BY NUCLEIC ACID (DNA OR RNA); SEVERE ACUTE RESPIRATORY SYNDROME CORONAVIRUS 2 (SARS-COV-2) (CORONAVIRUS DISEASE [COVID-19]), AMPLIFIED PROBE TECHNIQUE, MAKING USE OF HIGH THROUGHPUT TECHNOLOGIES AS DESCRIBED BY CMS-2020-01-R: HCPCS | Performed by: STUDENT IN AN ORGANIZED HEALTH CARE EDUCATION/TRAINING PROGRAM

## 2021-11-22 PROCEDURE — 99999 PR PBB SHADOW E&M-EST. PATIENT-LVL III: CPT | Mod: PBBFAC,,, | Performed by: STUDENT IN AN ORGANIZED HEALTH CARE EDUCATION/TRAINING PROGRAM

## 2021-11-22 PROCEDURE — U0005 INFEC AGEN DETEC AMPLI PROBE: HCPCS | Performed by: STUDENT IN AN ORGANIZED HEALTH CARE EDUCATION/TRAINING PROGRAM

## 2021-11-22 PROCEDURE — 99999 PR PBB SHADOW E&M-EST. PATIENT-LVL III: ICD-10-PCS | Mod: PBBFAC,,, | Performed by: STUDENT IN AN ORGANIZED HEALTH CARE EDUCATION/TRAINING PROGRAM

## 2021-11-22 PROCEDURE — 99203 OFFICE O/P NEW LOW 30 MIN: CPT | Mod: S$GLB,,, | Performed by: STUDENT IN AN ORGANIZED HEALTH CARE EDUCATION/TRAINING PROGRAM

## 2021-11-22 PROCEDURE — 99203 PR OFFICE/OUTPT VISIT, NEW, LEVL III, 30-44 MIN: ICD-10-PCS | Mod: S$GLB,,, | Performed by: STUDENT IN AN ORGANIZED HEALTH CARE EDUCATION/TRAINING PROGRAM

## 2021-11-22 RX ORDER — BENZONATATE 100 MG/1
100 CAPSULE ORAL 3 TIMES DAILY PRN
Qty: 20 CAPSULE | Refills: 1 | Status: SHIPPED | OUTPATIENT
Start: 2021-11-22 | End: 2021-12-02

## 2021-11-22 RX ORDER — AMLODIPINE BESYLATE 10 MG/1
10 TABLET ORAL DAILY
Qty: 90 TABLET | Refills: 3 | Status: SHIPPED | OUTPATIENT
Start: 2021-11-22 | End: 2022-11-22

## 2021-11-22 RX ORDER — GUAIFENESIN 400 MG/1
400 TABLET ORAL EVERY 6 HOURS PRN
Qty: 20 TABLET | Refills: 1 | Status: SHIPPED | OUTPATIENT
Start: 2021-11-22 | End: 2021-12-02

## 2021-12-13 ENCOUNTER — LAB VISIT (OUTPATIENT)
Dept: PRIMARY CARE CLINIC | Facility: OTHER | Age: 41
End: 2021-12-13
Attending: INTERNAL MEDICINE
Payer: COMMERCIAL

## 2021-12-13 DIAGNOSIS — Z20.822 ENCOUNTER FOR LABORATORY TESTING FOR COVID-19 VIRUS: ICD-10-CM

## 2021-12-13 PROCEDURE — U0003 INFECTIOUS AGENT DETECTION BY NUCLEIC ACID (DNA OR RNA); SEVERE ACUTE RESPIRATORY SYNDROME CORONAVIRUS 2 (SARS-COV-2) (CORONAVIRUS DISEASE [COVID-19]), AMPLIFIED PROBE TECHNIQUE, MAKING USE OF HIGH THROUGHPUT TECHNOLOGIES AS DESCRIBED BY CMS-2020-01-R: HCPCS | Performed by: INTERNAL MEDICINE

## 2021-12-14 LAB
SARS-COV-2 RNA RESP QL NAA+PROBE: NOT DETECTED
SARS-COV-2- CYCLE NUMBER: NORMAL

## 2022-02-21 ENCOUNTER — LAB VISIT (OUTPATIENT)
Dept: PRIMARY CARE CLINIC | Facility: OTHER | Age: 42
End: 2022-02-21
Payer: COMMERCIAL

## 2022-02-21 DIAGNOSIS — Z20.822 ENCOUNTER FOR LABORATORY TESTING FOR COVID-19 VIRUS: ICD-10-CM

## 2022-02-21 PROCEDURE — U0003 INFECTIOUS AGENT DETECTION BY NUCLEIC ACID (DNA OR RNA); SEVERE ACUTE RESPIRATORY SYNDROME CORONAVIRUS 2 (SARS-COV-2) (CORONAVIRUS DISEASE [COVID-19]), AMPLIFIED PROBE TECHNIQUE, MAKING USE OF HIGH THROUGHPUT TECHNOLOGIES AS DESCRIBED BY CMS-2020-01-R: HCPCS

## 2022-02-23 LAB
SARS-COV-2 RNA RESP QL NAA+PROBE: NOT DETECTED
SARS-COV-2- CYCLE NUMBER: NORMAL

## 2022-09-29 DIAGNOSIS — I10 HTN (HYPERTENSION), BENIGN: ICD-10-CM

## 2024-07-19 NOTE — PROGRESS NOTES
Patient arrives for Covid-19/SARS testing.    Swab collected using [facility] and CDC guidelines.    Patient given information on receiving results and left with mask in place.    
Son